# Patient Record
Sex: FEMALE | Race: WHITE | NOT HISPANIC OR LATINO | Employment: FULL TIME | URBAN - METROPOLITAN AREA
[De-identification: names, ages, dates, MRNs, and addresses within clinical notes are randomized per-mention and may not be internally consistent; named-entity substitution may affect disease eponyms.]

---

## 2022-02-10 ENCOUNTER — EVALUATION (OUTPATIENT)
Dept: PHYSICAL THERAPY | Facility: MEDICAL CENTER | Age: 52
End: 2022-02-10
Payer: COMMERCIAL

## 2022-02-10 DIAGNOSIS — Z47.89 ORTHOPEDIC AFTERCARE: Primary | ICD-10-CM

## 2022-02-10 DIAGNOSIS — M25.571 RIGHT ANKLE PAIN, UNSPECIFIED CHRONICITY: ICD-10-CM

## 2022-02-10 PROCEDURE — 97161 PT EVAL LOW COMPLEX 20 MIN: CPT | Performed by: PHYSICAL THERAPIST

## 2022-02-10 NOTE — PROGRESS NOTES
PT Evaluation     Today's date: 2/10/2022  Patient name: Tim Yi  : 1970  MRN: [id number]  Referring provider: Gerald Fulton MD  Dx:   Encounter Diagnosis     ICD-10-CM    1  Orthopedic aftercare  Z47 89    2  Right ankle pain, unspecified chronicity  M25 571                   Assessment  Assessment details: Tim Yi is a pleasant 46 y o  female who presents with R ankle pain 7 weeks s/p R subtalar debridement and peroneus brevis/longus tendon repairs (DOS: 21)  Patient is currently WBAT wearing a lace-up brace and sneaker  Incision on R lateral ankle visualized and is healing well with 2 very mild scabs present on the most inferior aspect of incision  No excessive erythema, no drainage, no warmth surrounding incision  Patient presented with mild abrasion/erythema and very mild bruising present on the dorsum of her R foot  Erythema subsided after bracing was removed for the physical exam  Patient was educated in proper tightness when donning lace-up brace and educated to avoid wearing multiple layers of socks when wearing brace to prevent compression of R foot  Will continue to monitor and adjust brace fit as necessary  No further referral is necessary at this time based upon examination results  The primary movement problem is R ankle hypomobility as expected 7 weeks s/p R subtalar debridement and peroneus brevis/longus tendon repairs, which limits her ability to perform sit to stand transfers  In addition, R ankle edema further limits her ability to ascend and descend stairs to prior capacity  Patient also presents with R ankle weakness compared to the contralateral side, which prevents her from ambulating longer distances  Patient would benefit from skilled PT services to address the listed impairments to facilitate a return to PLOF   Thank you for the referral     Impairments: abnormal coordination, abnormal gait, abnormal muscle firing, abnormal muscle tone, abnormal or restricted ROM, abnormal movement, activity intolerance, impaired balance, impaired physical strength, lacks appropriate home exercise program and pain with function  Functional limitations: sit to stand transfers, ascending/descending stairs, walking, biking  Symptom irritability: lowBarriers to therapy: none  Understanding of Dx/Px/POC: good   Prognosis: good  Prognosis details: Positive prognostic indicators include positive attitude toward recovery  Negative prognostic indicators include chronicity of R ankle pain prior to surgery  Goals  Patient will be independent with home exercise program    Patient will demonstrate decreased swelling in R ankle to improve R ankle AROM  Patient will increase R ankle AROM to be nearly comparable to the contralateral side in all planes to improve quality of gait mechanics  Patient will increase R ankle strength to at least 4+/5 to be able to ambulate longer distances  Patient will be able to perform sit to stand transfers without pain  Patient will be able to ambulate 30 min with modifications as necessary  Patient will be able to ride her bike  Patient will be able to manage symptoms independently  Plan  Plan details: Prognosis is above given PT services 2x/week tapering to 1x/week over the next 8 weeks and given HEP adherence    Patient would benefit from: skilled physical therapy  Referral necessary: No  Planned modality interventions: cryotherapy and thermotherapy: hydrocollator packs  Planned therapy interventions: activity modification, balance, balance/weight bearing training, body mechanics training, compression, flexibility, functional ROM exercises, gait training, graded activity, graded exercise, home exercise program, joint mobilization, manual therapy, massage, Seals taping, motor coordination training, neuromuscular re-education, patient education, strengthening, stretching, therapeutic activities and therapeutic exercise  Frequency: 2x week  Duration in weeks: 8  Treatment plan discussed with: patient        Subjective Evaluation    History of Present Illness  Date of surgery: 2021  Mechanism of injury: surgery  Mechanism of injury: This is a 45 yo female presenting with R ankle pain 7 weeks s/p R subtalar debridement with repair of peroneus brevis and longus tendon (DOS: 21)  She reports that she has a history of R ankle pain after rolling her ankle a few times over the years when playing softball  She reports that she overturned her ankle when walking 2 years ago, and she developed a lot of ankle pain that limited her ability to walk  She received a CT and an MRI at this time that showed arthritis and bone spurs  She decided to proceed with a subtalar debridement on 21 and also had her peroneus brevis and longus tendons repaired  She reports that she was NWB in a CAM boot for 2 weeks after the surgery  After 2 weeks, she was WBAT in her CAM boot and used crutches for 1-2 weeks  She had her last physician follow-up on 22, and she has now weaned out of her CAM boot and into a lace-up brace with her sneaker  She reports that her pain is very minimal, but she has some mild pain in the front of her ankle when standing to get up after sitting for a long period of time  She also reports stiffness in the back of her ankle  Her biggest goals are to return to walking and biking            Not a recurrent problem   Quality of life: good    Pain  Current pain ratin  At best pain ratin  At worst pain ratin  Location: front of R ankle  Quality: tight (tightness in the back of R ankle)  Relieving factors: ice  Aggravating factors: stair climbing, standing and walking  Progression: improved    Social Support    Employment status: working (works in administration)    Diagnostic Tests  MRI studies: abnormal (prior to surgery per patient)  CT scan: abnormal (prior to surgery per patient)  Treatments  Previous treatment: injection treatment and immobilization  Patient Goals  Patient goals for therapy: decreased pain, increased motion, increased strength, independence with ADLs/IADLs and return to sport/leisure activities  Patient goal: to be able to walk, to be able to ascend/descend stairs, to be able to bike        Objective     Observations     Right Ankle/Foot   Positive for abrasion (dorsum of R foot), edema (mild- R lateral ankle) and incision       Additional Observation Details  Incision on R lateral ankle visualized and is well-healed with 2 very mild scabs noted on most inferior aspect of the incision; no excessive erythema, no drainage, no warmth    Mild erythema/very mild bruising noted on dorsum of R foot (most likely due to compression from bracing being too tight)    Active Range of Motion   Left Ankle/Foot   Dorsiflexion (ke): 0 degrees   Plantar flexion: 45 degrees   Inversion: 40 degrees   Eversion: 10 degrees     Right Ankle/Foot   Dorsiflexion (ke): 0 degrees   Plantar flexion: 40 degrees   Inversion: 4 degrees   Eversion: 2 degrees     Passive Range of Motion   Left Ankle/Foot    Dorsiflexion (ke): 5 degrees   Plantar flexion: 45 degrees   Inversion: 40 degrees   Eversion: 15 degrees     Right Ankle/Foot    Dorsiflexion (ke): 0 degrees   Plantar flexion: 45 degrees   Inversion: 8 degrees   Eversion: 5 degrees     Strength/Myotome Testing     Left Ankle/Foot   Dorsiflexion: 5  Plantar flexion: 5  Inversion: 5  Eversion: 5    Additional Strength Details  R ankle strength testing deferred due to post-op period    Tests     Additional Tests Details  R ankle special testing deferred due to post-op period    Ambulation   Weight-Bearing Status   Weight-Bearing Status (Right): weight-bearing as tolerated    wearing lace-up brace     Observational Gait   Gait: antalgic              Precautions: s/p R subtalar joint debridement + peroneus longus/brevis tendon repairs (DOS: 12/23/21)- WBAT in ankle brace      Manuals 2/10 R ankle PROM (NO INV/EV)                                                    Neuro Re-Ed             Romberg             Tandem             SLS                                                                 Ther Ex             Rec bike NV            Ankle DF/PF AROM 5"x30 HEP            Strap gastroc stretch NV            Seated heel slides NV            Seated towel intrinsic scoops NV            BAPS NV DF/PF                                      Ther Activity                                       Gait Training                                       Modalities             CP to R ankle PRN

## 2022-02-15 ENCOUNTER — OFFICE VISIT (OUTPATIENT)
Dept: PHYSICAL THERAPY | Facility: MEDICAL CENTER | Age: 52
End: 2022-02-15
Payer: COMMERCIAL

## 2022-02-15 DIAGNOSIS — M25.571 RIGHT ANKLE PAIN, UNSPECIFIED CHRONICITY: Primary | ICD-10-CM

## 2022-02-15 DIAGNOSIS — Z47.89 ORTHOPEDIC AFTERCARE: ICD-10-CM

## 2022-02-15 PROCEDURE — 97110 THERAPEUTIC EXERCISES: CPT | Performed by: PHYSICAL THERAPIST

## 2022-02-15 PROCEDURE — 97140 MANUAL THERAPY 1/> REGIONS: CPT | Performed by: PHYSICAL THERAPIST

## 2022-02-15 NOTE — PROGRESS NOTES
Daily Note     Today's date: 2/15/2022  Patient name: Rodney Hicks  : 1970  MRN: [id number]  Referring provider: Ning Fisher MD  Dx:   Encounter Diagnosis     ICD-10-CM    1  Right ankle pain, unspecified chronicity  M25 571    2  Orthopedic aftercare  Z47 89                   Subjective: Patient reports that her ankle is feeling pretty good with no significant pain, but she has some stiffness at times  She reports that she has some stiffness first thing in the mornings  Objective: See treatment diary below      Assessment: Patient continues to demonstrate R ankle DF hypomobility  However, she responded well to manuals with improved R ankle DF AROM post-tx  Added gentle towel stretch and intrinsic scoops to HEP to improve ankle mobility and foot strength  Patient was also educated to increase frequency of active ankle DF/PF AROM during HEP to further improve ROM  All exercises performed in a pain-free range  Patient would benefit from continued PT to address impairments to maximize function  Plan: Continue per plan of care        Precautions: s/p R subtalar joint debridement + peroneus longus/brevis tendon repairs (DOS: 21)- WBAT in ankle brace      Manuals 2/10 2/15           R ankle PROM (NO INV/EV)  KP                                                  Neuro Re-Ed             Romberg  NV           Tandem  NV           SLS                                                                 Ther Ex             Rec bike NV 5 min           Ankle DF/PF AROM 5"x30 HEP 5"x30 tband NV          Strap gastroc stretch NV 30"x4           Seated heel slides NV 5"x10           Seated towel intrinsic scoops NV 5"x20           BAPS NV DF/PF 3"x20 ea DF/PF L2           Weight shifts  20 ea                        Ther Activity                                       Gait Training                                       Modalities             CP to R ankle PRN

## 2022-02-17 ENCOUNTER — OFFICE VISIT (OUTPATIENT)
Dept: PHYSICAL THERAPY | Facility: MEDICAL CENTER | Age: 52
End: 2022-02-17
Payer: COMMERCIAL

## 2022-02-17 DIAGNOSIS — M25.571 RIGHT ANKLE PAIN, UNSPECIFIED CHRONICITY: Primary | ICD-10-CM

## 2022-02-17 DIAGNOSIS — Z47.89 ORTHOPEDIC AFTERCARE: ICD-10-CM

## 2022-02-17 PROCEDURE — 97110 THERAPEUTIC EXERCISES: CPT | Performed by: PHYSICAL THERAPIST

## 2022-02-17 PROCEDURE — 97140 MANUAL THERAPY 1/> REGIONS: CPT | Performed by: PHYSICAL THERAPIST

## 2022-02-17 NOTE — PROGRESS NOTES
Daily Note     Today's date: 2022  Patient name: Michele Peres  : 1970  MRN: [id number]  Referring provider: Pooja Swanson MD  Dx:   Encounter Diagnosis     ICD-10-CM    1  Right ankle pain, unspecified chronicity  M25 571    2  Orthopedic aftercare  Z47 89                   Subjective: Patient reports that her ankle is feeling much better, and it is a lot less tight when walking  Objective: See treatment diary below      Assessment: Patient continues to demonstrate good progress with improving R ankle DF/PF AROM  Positive response to manuals with improved R ankle DF PROM post-tx  Continued to hold on INV/EV PROM to prevent excessive stress on healing peroneal tendons  Patient was given tband for DF/PF AROM for HEP performance, which demonstrates good progress toward goals  Initiated balance training today while wearing lace-up brace with cueing provided to prevent cervical FLX  All exercises performed in a pain-free range  Patient would benefit from continued PT to improve ankle mobility and strength to return to active lifestyle  Plan: Continue per plan of care        Precautions: s/p R subtalar joint debridement + peroneus longus/brevis tendon repairs (DOS: 21)- WBAT in ankle brace      Manuals 2/10 2/15 2/17          R ankle PROM (NO INV/EV)  KP KP                                                 Neuro Re-Ed             Romberg  NV           Tandem  NV 3x ea to fatigue b/l          SLS                                                                 Ther Ex             Rec bike NV 5 min 5 min          Ankle DF/PF AROM 5"x30 HEP 5"x30 3"x20 YTB          Strap gastroc stretch NV 30"x4 30"x4          Seated heel slides NV 5"x10 10"x10          Seated towel intrinsic scoops NV 5"x20 5"x20          BAPS NV DF/PF 3"x20 ea DF/PF L2 3"x30 ea DF/PF L2          Weight shifts  20 ea 20 ea                       Ther Activity                                       Gait Training Modalities             CP to R ankle PRN

## 2022-02-21 ENCOUNTER — OFFICE VISIT (OUTPATIENT)
Dept: PHYSICAL THERAPY | Facility: MEDICAL CENTER | Age: 52
End: 2022-02-21
Payer: COMMERCIAL

## 2022-02-21 DIAGNOSIS — Z47.89 ORTHOPEDIC AFTERCARE: ICD-10-CM

## 2022-02-21 DIAGNOSIS — M25.571 RIGHT ANKLE PAIN, UNSPECIFIED CHRONICITY: Primary | ICD-10-CM

## 2022-02-21 PROCEDURE — 97110 THERAPEUTIC EXERCISES: CPT | Performed by: PHYSICAL THERAPIST

## 2022-02-21 PROCEDURE — 97140 MANUAL THERAPY 1/> REGIONS: CPT | Performed by: PHYSICAL THERAPIST

## 2022-02-21 NOTE — PROGRESS NOTES
Daily Note     Today's date: 2022  Patient name: Deborah Purdy  : 1970  MRN: [id number]  Referring provider: Bela Crawford MD  Dx:   Encounter Diagnosis     ICD-10-CM    1  Right ankle pain, unspecified chronicity  M25 571    2  Orthopedic aftercare  Z47 89                   Subjective: Patient reports that her ankle is continuing to feel better overall  She has some stiffness in the mornings and some discomfort on the inside of her ankle at times but is pleased with her overall progress  Objective: See treatment diary below      Assessment: Patient continues to demonstrate good progress with improving R ankle DF/PF AROM and PROM  Initiated gentle INV/EV PROM and AROM within a pain-free range  Cueing provided to prevent compensation from knee during INV/EV AROM  Patient was given tband of increased resistance for DF/PF for HEP performance, which demonstrates good progress toward goals  Significant improvement in control noted during tandem stance today  Patient reported alleviation of stiffness when ambulating at the end of session  Patient would benefit from continued PT to improve ankle mobility and stability to return to active lifestyle  Plan: Continue per plan of care        Precautions: s/p R subtalar joint debridement + peroneus longus/brevis tendon repairs (DOS: 21)- WBAT in ankle brace      Manuals 2/10 2/15 2/17 2/21         R ankle PROM   KP DF/PF KP DF/PF KP 4-way                                                Neuro Re-Ed             Romberg  NV           Tandem  NV 3x ea to fatigue b/l 5x ea to fatigue b/l         SLS                                                                 Ther Ex             Rec bike NV 5 min 5 min 5 min         Ankle DF/PF AROM 5"x30 HEP 5"x30 3"x20 YTB 3"x20 RTB         Ankle INV/EV AROM    3"x10 ea no band         Strap gastroc stretch NV 30"x4 30"x4 30"x4         Seated heel slides NV 5"x10 10"x10 10"x10         Seated towel intrinsic scoops NV 5"x20 5"x20 5"x30         BAPS NV DF/PF 3"x20 ea DF/PF L2 3"x30 ea DF/PF L2 3"x30 ea DF/PF L3         Wall gastroc stretch    20"x3         Weight shifts  20 ea 20 ea                       Ther Activity                                       Gait Training                                       Modalities             CP to R ankle PRN

## 2022-02-24 ENCOUNTER — OFFICE VISIT (OUTPATIENT)
Dept: PHYSICAL THERAPY | Facility: MEDICAL CENTER | Age: 52
End: 2022-02-24
Payer: COMMERCIAL

## 2022-02-24 DIAGNOSIS — M25.571 RIGHT ANKLE PAIN, UNSPECIFIED CHRONICITY: Primary | ICD-10-CM

## 2022-02-24 DIAGNOSIS — Z47.89 ORTHOPEDIC AFTERCARE: ICD-10-CM

## 2022-02-24 PROCEDURE — 97110 THERAPEUTIC EXERCISES: CPT | Performed by: PHYSICAL THERAPIST

## 2022-02-24 PROCEDURE — 97140 MANUAL THERAPY 1/> REGIONS: CPT | Performed by: PHYSICAL THERAPIST

## 2022-02-24 NOTE — PROGRESS NOTES
Daily Note     Today's date: 2022  Patient name: Tim Yi  : 1970  MRN: [id number]  Referring provider: Gerald Fulton MD  Dx:   Encounter Diagnosis     ICD-10-CM    1  Right ankle pain, unspecified chronicity  M25 571    2  Orthopedic aftercare  Z47 89                   Subjective: Patient reports that her ankle is feeling okay with no major changes since last session  She still has some discomfort on the inside of her ankle at times  Objective: See treatment diary below      Assessment: Patient continues to demonstrate R ankle INV/EV hypomobility  However, subtalar AROM has improved since last session, and she is consistently demonstrating improved R ankle DF AROM  Able to progress resistance for resisted ankle DF/PF, which further demonstrates good progress toward goals  Added seated heel raises to improve CKC strength within a pain-free range  Patient also demonstrated improved control during tandem stance today  No pain reported during or after session  Patient would benefit from continued PT to progress strengthening as able to return to PLOF  Plan: Continue per plan of care        Precautions: s/p R subtalar joint debridement + peroneus longus/brevis tendon repairs (DOS: 21)- WBAT in ankle brace      Manuals 2/10 2/15 2/17 2/21 2/24        R ankle PROM   KP DF/PF KP DF/PF KP 4-way KP 4-way                                               Neuro Re-Ed             Romberg  NV           Tandem  NV 3x ea to fatigue b/l 5x ea to fatigue b/l 5x ea to fatigue b/l        SLS                                                                 Ther Ex             Rec bike NV 5 min 5 min 5 min 5 min        Ankle DF/PF AROM 5"x30 HEP 5"x30 3"x20 YTB 3"x20 RTB 3"x20 GTB        Ankle INV/EV AROM    3"x10 ea no band 3"x20 ea no band        Strap gastroc stretch NV 30"x4 30"x4 30"x4 30"x4        Seated heel slides NV 5"x10 10"x10 10"x10 10"x10        Seated heel raises     2x10        Seated towel intrinsic scoops NV 5"x20 5"x20 5"x30 5"x30        BAPS NV DF/PF 3"x20 ea DF/PF L2 3"x30 ea DF/PF L2 3"x30 ea DF/PF L3 3"x30 ea DF/PF L3        Wall gastroc stretch    20"x3 30"x4        Weight shifts  20 ea 20 ea                       Ther Activity                                       Gait Training                                       Modalities             CP to R ankle PRN

## 2022-02-28 ENCOUNTER — OFFICE VISIT (OUTPATIENT)
Dept: PHYSICAL THERAPY | Facility: MEDICAL CENTER | Age: 52
End: 2022-02-28
Payer: COMMERCIAL

## 2022-02-28 DIAGNOSIS — M25.571 RIGHT ANKLE PAIN, UNSPECIFIED CHRONICITY: Primary | ICD-10-CM

## 2022-02-28 DIAGNOSIS — Z47.89 ORTHOPEDIC AFTERCARE: ICD-10-CM

## 2022-02-28 PROCEDURE — 97110 THERAPEUTIC EXERCISES: CPT | Performed by: PHYSICAL THERAPIST

## 2022-02-28 PROCEDURE — 97140 MANUAL THERAPY 1/> REGIONS: CPT | Performed by: PHYSICAL THERAPIST

## 2022-02-28 NOTE — PROGRESS NOTES
Daily Note     Today's date: 2022  Patient name: Hemalatha Tracy  : 1970  MRN: [id number]  Referring provider: Yelitza Cobb MD  Dx:   Encounter Diagnosis     ICD-10-CM    1  Right ankle pain, unspecified chronicity  M25 571    2  Orthopedic aftercare  Z47 89                   Subjective: Patient reports that she walked approx  1 mile at a festival over the weekend and had some soreness yesterday and this morning  However, she reports that the stretches helped reduce the soreness  Objective: See treatment diary below      Assessment: Patient continues to demonstrate steady progress with improving R ankle DF AROM and PROM  She presents with INV/EV hypomobility  However, she demonstrated a notable improvement in INV/EV AROM today compared to last session  Held CKC strengthening progressions due to baseline soreness since last visit  However, she was able to progress tandem stance to be performed on airex pad today, which demonstrates an improvement in neuromotor control  All exercises performed in a pain-free range  Patient would benefit from continued PT to improve ankle ROM and strength to return to full participation in active lifestyle to prior capacity  Plan: Continue per plan of care        Precautions: s/p R subtalar joint debridement + peroneus longus/brevis tendon repairs (DOS: 21)- WBAT in ankle brace      Manuals 2/10 2/15 2/17 2/21 2/24 2/28       R ankle PROM   KP DF/PF KP DF/PF KP 4-way KP 4-way KP 4-way                                              Neuro Re-Ed             Romberg  NV           Tandem  NV 3x ea to fatigue b/l 5x ea to fatigue b/l 5x ea to fatigue b/l 5x ea to fatigue b/l airex       SLS                                                                 Ther Ex             Rec bike NV 5 min 5 min 5 min 5 min 5 min       Ankle DF/PF AROM 5"x30 HEP 5"x30 3"x20 YTB 3"x20 RTB 3"x20 GTB 3"x30 GTB       Ankle INV/EV AROM    3"x10 ea no band 3"x20 ea no band 5"x30 ea no band       Strap gastroc stretch NV 30"x4 30"x4 30"x4 30"x4 30"x4       Seated heel slides NV 5"x10 10"x10 10"x10 10"x10 10"x10       Seated heel raises     2x10 2x10       Seated towel intrinsic scoops NV 5"x20 5"x20 5"x30 5"x30 5"x30       BAPS NV DF/PF 3"x20 ea DF/PF L2 3"x30 ea DF/PF L2 3"x30 ea DF/PF L3 3"x30 ea DF/PF L3 5"x30 ea DF/PF L3       Wall gastroc stretch    20"x3 30"x4 30"x4       Weight shifts  20 ea 20 ea                       Ther Activity                                       Gait Training                                       Modalities             CP to R ankle PRN

## 2022-03-03 ENCOUNTER — OFFICE VISIT (OUTPATIENT)
Dept: PHYSICAL THERAPY | Facility: MEDICAL CENTER | Age: 52
End: 2022-03-03
Payer: COMMERCIAL

## 2022-03-03 DIAGNOSIS — M25.571 RIGHT ANKLE PAIN, UNSPECIFIED CHRONICITY: Primary | ICD-10-CM

## 2022-03-03 DIAGNOSIS — Z47.89 ORTHOPEDIC AFTERCARE: ICD-10-CM

## 2022-03-03 PROCEDURE — 97110 THERAPEUTIC EXERCISES: CPT | Performed by: PHYSICAL THERAPIST

## 2022-03-03 PROCEDURE — 97140 MANUAL THERAPY 1/> REGIONS: CPT | Performed by: PHYSICAL THERAPIST

## 2022-03-03 PROCEDURE — 97112 NEUROMUSCULAR REEDUCATION: CPT | Performed by: PHYSICAL THERAPIST

## 2022-03-03 NOTE — PROGRESS NOTES
Daily Note     Today's date: 3/3/2022  Patient name: Ben Null  : 1970  MRN: [id number]  Referring provider: Fernando Jurado MD  Dx:   Encounter Diagnosis     ICD-10-CM    1  Right ankle pain, unspecified chronicity  M25 571    2  Orthopedic aftercare  Z47 89                   Subjective: Patient reports that her ankle has some stiffness at times, but her soreness has resolved  She reports that it is getting better overall  Objective: See treatment diary below      Assessment: Patient is demonstrating steady progress with improving R ankle DF/PF AROM and PROM  She continues to present with INV/EV hypomobility but is demonstrating good progress with restoring R subtalar AROM each session  Able to progress reps for seated heel raises, which demonstrates good progress toward goals  Added INV/EV BAPS board to improve subtalar mobility/stability with cueing provided to prevent compensation from knee  Significant improvement noted during tandem stance today compared to last session  Patient was challenged with addition of SLS but was able to complete intervention without pain  All exercises performed in a pain-free range  Patient would benefit from continued PT to further improve ankle ROM and strength to return to PLOF  Plan: Continue per plan of care        Precautions: s/p R subtalar joint debridement + peroneus longus/brevis tendon repairs (DOS: 21)- WBAT in ankle brace      Manuals 2/10 2/15 2/17 2/21 2/24 2/28 3/3      R ankle PROM   KP DF/PF KP DF/PF KP 4-way KP 4-way KP 4-way KP 4-way                                             Neuro Re-Ed             Romberg  NV           Tandem  NV 3x ea to fatigue b/l 5x ea to fatigue b/l 5x ea to fatigue b/l 5x ea to fatigue b/l airex 5x ea to fatigue b/l airex      SLS       5x to fatigue no airex                                                          Ther Ex             Rec bike NV 5 min 5 min 5 min 5 min 5 min 5 min      Ankle DF/PF AROM 5"x30 HEP 5"x30 3"x20 YTB 3"x20 RTB 3"x20 GTB 3"x30 GTB 3"x30 GTB      Ankle INV/EV AROM    3"x10 ea no band 3"x20 ea no band 5"x30 ea no band 5"x30 ea no band      Strap gastroc stretch NV 30"x4 30"x4 30"x4 30"x4 30"x4 30"x4      Seated heel slides NV 5"x10 10"x10 10"x10 10"x10 10"x10 10"x10      Seated heel raises     2x10 2x10 3x10      Seated towel intrinsic scoops NV 5"x20 5"x20 5"x30 5"x30 5"x30 5"x30      BAPS NV DF/PF 3"x20 ea DF/PF L2 3"x30 ea DF/PF L2 3"x30 ea DF/PF L3 3"x30 ea DF/PF L3 5"x30 ea DF/PF L3 5"x30 ea DF/PF L3, 3"x20 ea INV/EV L1      Wall gastroc stretch    20"x3 30"x4 30"x4 30"x4      Weight shifts  20 ea 20 ea                       Ther Activity                                       Gait Training                                       Modalities             CP to R ankle PRN

## 2022-03-07 ENCOUNTER — OFFICE VISIT (OUTPATIENT)
Dept: PHYSICAL THERAPY | Facility: MEDICAL CENTER | Age: 52
End: 2022-03-07
Payer: COMMERCIAL

## 2022-03-07 DIAGNOSIS — Z47.89 ORTHOPEDIC AFTERCARE: ICD-10-CM

## 2022-03-07 DIAGNOSIS — M25.571 RIGHT ANKLE PAIN, UNSPECIFIED CHRONICITY: Primary | ICD-10-CM

## 2022-03-07 PROCEDURE — 97110 THERAPEUTIC EXERCISES: CPT | Performed by: PHYSICAL THERAPIST

## 2022-03-07 PROCEDURE — 97112 NEUROMUSCULAR REEDUCATION: CPT | Performed by: PHYSICAL THERAPIST

## 2022-03-07 PROCEDURE — 97140 MANUAL THERAPY 1/> REGIONS: CPT | Performed by: PHYSICAL THERAPIST

## 2022-03-07 NOTE — PROGRESS NOTES
Daily Note     Today's date: 3/7/2022  Patient name: Carmelita Gr  : 1970  MRN: [id number]  Referring provider: Carola Werner MD  Dx:   Encounter Diagnosis     ICD-10-CM    1  Right ankle pain, unspecified chronicity  M25 571    2  Orthopedic aftercare  Z47 [id number]                   Subjective: Patient reports that she has some discomfort in the front of her ankle in the middle of the night, but it seems to get better with movement and exercises  She is pleased with her overall progress  Objective: See treatment diary below      Assessment: Patient continues to demonstrate good progress with improving R ankle AROM and PROM in all planes  She demonstrates the most notable restrictions in INV/EV AROM and PROM, but this continues to improve each session  Patient was given tband of increased resistance for DF/PF for HEP performance, which demonstrates good progress toward goals  Initiated gentle CKC strengthening with addition of standing heel/toe raises  Cueing provided during toe raises to prevent compensation from trunk  Patient demonstrated fatigue after SLS on airex pad but was able to complete intervention without LOB  Patient also reported alleviation of tightness after reps of wall soleus stretch  Patient would benefit from continued PT      Plan: Continue per plan of care        Precautions: s/p R subtalar joint debridement + peroneus longus/brevis tendon repairs (DOS: 21)- WBAT in ankle brace      Manuals 2/10 2/15 2/17 2/21 2/24 2/28 3/3 3     R ankle PROM   KP DF/PF KP DF/PF KP 4-way KP 4-way KP 4-way KP 4-way KP 4-way                                            Neuro Re-Ed             Romberg  NV           Tandem  NV 3x ea to fatigue b/l 5x ea to fatigue b/l 5x ea to fatigue b/l 5x ea to fatigue b/l airex 5x ea to fatigue b/l airex 5x ea to fatigue b/l airex     SLS       5x to fatigue no airex 5x to fatigue airex, finger touch Ther Ex             Rec bike NV 5 min 5 min 5 min 5 min 5 min 5 min 5 min     Ankle DF/PF AROM 5"x30 HEP 5"x30 3"x20 YTB 3"x20 RTB 3"x20 GTB 3"x30 GTB 3"x30 GTB 5"x30 blue     Ankle INV/EV AROM    3"x10 ea no band 3"x20 ea no band 5"x30 ea no band 5"x30 ea no band 5"x30 ea no band     Strap gastroc stretch NV 30"x4 30"x4 30"x4 30"x4 30"x4 30"x4 30"x4     Seated heel slides NV 5"x10 10"x10 10"x10 10"x10 10"x10 10"x10 10"x10     Seated heel raises     2x10 2x10 3x10 3"x20 stand     Seated towel intrinsic scoops NV 5"x20 5"x20 5"x30 5"x30 5"x30 5"x30 HEP     Standing toe raises        3"x20     BAPS NV DF/PF 3"x20 ea DF/PF L2 3"x30 ea DF/PF L2 3"x30 ea DF/PF L3 3"x30 ea DF/PF L3 5"x30 ea DF/PF L3 5"x30 ea DF/PF L3, 3"x20 ea INV/EV L1 5"x30 ea DF/PF L3, 3"x20 ea INV/EV L3     Wall gastroc stretch    20"x3 30"x4 30"x4 30"x4 30"x4     Wall soleus stretch        20"x3     Weight shifts  20 ea 20 ea                       Ther Activity                                       Gait Training                                       Modalities             CP to R ankle PRN

## 2022-03-10 ENCOUNTER — OFFICE VISIT (OUTPATIENT)
Dept: PHYSICAL THERAPY | Facility: MEDICAL CENTER | Age: 52
End: 2022-03-10
Payer: COMMERCIAL

## 2022-03-10 DIAGNOSIS — Z47.89 ORTHOPEDIC AFTERCARE: ICD-10-CM

## 2022-03-10 DIAGNOSIS — M25.571 RIGHT ANKLE PAIN, UNSPECIFIED CHRONICITY: Primary | ICD-10-CM

## 2022-03-10 PROCEDURE — 97110 THERAPEUTIC EXERCISES: CPT | Performed by: PHYSICAL THERAPIST

## 2022-03-10 PROCEDURE — 97112 NEUROMUSCULAR REEDUCATION: CPT | Performed by: PHYSICAL THERAPIST

## 2022-03-10 PROCEDURE — 97140 MANUAL THERAPY 1/> REGIONS: CPT | Performed by: PHYSICAL THERAPIST

## 2022-03-10 NOTE — PROGRESS NOTES
Daily Note     Today's date: 3/10/2022  Patient name: Juan Parisi  : 1970  MRN: [id number]  Referring provider: Geri Moscoso MD  Dx:   Encounter Diagnosis     ICD-10-CM    1  Right ankle pain, unspecified chronicity  M25 571    2  Orthopedic aftercare  Z47 [id number]                   Subjective: Patient reports that her ankle is feeling better with less stiffness and pain at night  Objective: See treatment diary below      Assessment: Patient continues to demonstrate good progress with improving R ankle AROM and PROM in all planes each session  INV/EV hypomobility remains but has improved today compared to last session  Added tband to resisted INV/EV, which demonstrates good progress toward goals  Patient reported mild discomfort in R lateral ankle just inferior to lateral malleolus after INV/EV interventions, but she was able to complete exercise without pain  No tenderness to palpation of peroneal tendons  SLS stability notably improved today  Patient was given tband of increased resistance for DF/PF for HEP performance  Patient would benefit from continued PT      Plan: Continue per plan of care        Precautions: s/p R subtalar joint debridement + peroneus longus/brevis tendon repairs (DOS: 21)- WBAT in ankle brace      Manuals 2/10 2/15 2/17 2/21 2/24 2/28 3/3 3/7 3/10    R ankle PROM   KP DF/PF KP DF/PF KP 4-way KP 4-way KP 4-way KP 4-way KP 4-way KP 4-way                                           Neuro Re-Ed             Romberg  NV           Tandem  NV 3x ea to fatigue b/l 5x ea to fatigue b/l 5x ea to fatigue b/l 5x ea to fatigue b/l airex 5x ea to fatigue b/l airex 5x ea to fatigue b/l airex 5x ea to fatgiue b/l airex    SLS       5x to fatigue no airex 5x to fatigue airex, finger touch 5x to fatigue airex, finger touch                                                        Ther Ex             Rec bike NV 5 min 5 min 5 min 5 min 5 min 5 min 5 min 5 min    Ankle DF/PF AROM 5"x30 HEP 5"x30 3"x20 YTB 3"x20 RTB 3"x20 GTB 3"x30 GTB 3"x30 GTB 5"x30 blue 5"x30 black    Ankle INV/EV AROM    3"x10 ea no band 3"x20 ea no band 5"x30 ea no band 5"x30 ea no band 5"x30 ea no band 3"x10 ea YTB    Strap gastroc stretch NV 30"x4 30"x4 30"x4 30"x4 30"x4 30"x4 30"x4 30"x4    Seated heel slides NV 5"x10 10"x10 10"x10 10"x10 10"x10 10"x10 10"x10 NV    Seated heel raises     2x10 2x10 3x10 3"x20 stand 3"x30 stand    Seated towel intrinsic scoops NV 5"x20 5"x20 5"x30 5"x30 5"x30 5"x30 HEP     Standing toe raises        3"x20 3"x20    BAPS NV DF/PF 3"x20 ea DF/PF L2 3"x30 ea DF/PF L2 3"x30 ea DF/PF L3 3"x30 ea DF/PF L3 5"x30 ea DF/PF L3 5"x30 ea DF/PF L3, 3"x20 ea INV/EV L1 5"x30 ea DF/PF L3, 3"x20 ea INV/EV L3 5"x30 ea 4-way L3    Wall gastroc stretch    20"x3 30"x4 30"x4 30"x4 30"x4 30"x4    Wall soleus stretch        20"x3 30"x4    Weight shifts  20 ea 20 ea                       Ther Activity                                       Gait Training                                       Modalities             CP to R ankle PRN

## 2022-03-14 ENCOUNTER — OFFICE VISIT (OUTPATIENT)
Dept: PHYSICAL THERAPY | Facility: MEDICAL CENTER | Age: 52
End: 2022-03-14
Payer: COMMERCIAL

## 2022-03-14 DIAGNOSIS — Z47.89 ORTHOPEDIC AFTERCARE: ICD-10-CM

## 2022-03-14 DIAGNOSIS — M25.571 RIGHT ANKLE PAIN, UNSPECIFIED CHRONICITY: Primary | ICD-10-CM

## 2022-03-14 PROCEDURE — 97140 MANUAL THERAPY 1/> REGIONS: CPT | Performed by: PHYSICAL THERAPIST

## 2022-03-14 PROCEDURE — 97112 NEUROMUSCULAR REEDUCATION: CPT | Performed by: PHYSICAL THERAPIST

## 2022-03-14 PROCEDURE — 97110 THERAPEUTIC EXERCISES: CPT | Performed by: PHYSICAL THERAPIST

## 2022-03-14 NOTE — PROGRESS NOTES
Daily Note     Today's date: 3/14/2022  Patient name: Christie Loya  : 1970  MRN: [id number]  Referring provider: Jailene Trejo MD  Dx:   Encounter Diagnosis     ICD-10-CM    1  Right ankle pain, unspecified chronicity  M25 571    2  Orthopedic aftercare  Z47 89                   Subjective: Patient reports that her ankle is feeling better when walking, and she has less stiffness at night  Objective: See treatment diary below      Assessment: Patient continues to demonstrate R ankle hypomobility in all planes but responds well to manuals with improvement in R ankle AROM in all planes post-tx  Patient also demonstrated improve INV/EV AROM with resistance today compared to last session  Patient was educated to add tband INV/EV to HEP within a pain-free range  Able to perform balance interventions without visual input and improved stability, which further demonstrates good progress toward goals  Patient would benefit from continued PT to progress CKC strengthening as appropriate to return to longer distance community ambulation  Plan: Continue per plan of care        Precautions: s/p R subtalar joint debridement + peroneus longus/brevis tendon repairs (DOS: 21)- WBAT in ankle brace      Manuals 3/14 2/10 2/15 2/17 2/21 2/24 2/28 3/3 3/7 3/10    R ankle PROM  KP 4-way  KP DF/PF KP DF/PF KP 4-way KP 4-way KP 4-way KP 4-way KP 4-way KP 4-way                                              Neuro Re-Ed              Romberg   NV           Tandem 5x ea to fatigue b/l airex EO/EC  NV 3x ea to fatigue b/l 5x ea to fatigue b/l 5x ea to fatigue b/l 5x ea to fatigue b/l airex 5x ea to fatigue b/l airex 5x ea to fatigue b/l airex 5x ea to fatgiue b/l airex    SLS 5x to fatigue airex EO/EC       5x to fatigue no airex 5x to fatigue airex, finger touch 5x to fatigue airex, finger touch                                                            Ther Ex              Rec bike 5 min NV 5 min 5 min 5 min 5 min 5 min 5 min 5 min 5 min    Ankle DF/PF AROM HEP black 5"x30 HEP 5"x30 3"x20 YTB 3"x20 RTB 3"x20 GTB 3"x30 GTB 3"x30 GTB 5"x30 blue 5"x30 black    Ankle INV/EV AROM 5"x20 ea YTB    3"x10 ea no band 3"x20 ea no band 5"x30 ea no band 5"x30 ea no band 5"x30 ea no band 3"x10 ea YTB    Strap gastroc stretch 30"x4 NV 30"x4 30"x4 30"x4 30"x4 30"x4 30"x4 30"x4 30"x4    Seated heel slides 10"x10 NV 5"x10 10"x10 10"x10 10"x10 10"x10 10"x10 10"x10 NV    Seated heel raises 5"x30 stand     2x10 2x10 3x10 3"x20 stand 3"x30 stand    Seated towel intrinsic scoops HEP NV 5"x20 5"x20 5"x30 5"x30 5"x30 5"x30 HEP     Standing toe raises 5"x20        3"x20 3"x20    BAPS 5"x30 ea 4-way L3 NV DF/PF 3"x20 ea DF/PF L2 3"x30 ea DF/PF L2 3"x30 ea DF/PF L3 3"x30 ea DF/PF L3 5"x30 ea DF/PF L3 5"x30 ea DF/PF L3, 3"x20 ea INV/EV L1 5"x30 ea DF/PF L3, 3"x20 ea INV/EV L3 5"x30 ea 4-way L3    Wall gastroc stretch 30"x4    20"x3 30"x4 30"x4 30"x4 30"x4 30"x4    Wall soleus stretch 30"x4        20"x3 30"x4    Weight shifts   20 ea 20 ea                        Ther Activity                                          Gait Training                                          Modalities              CP to R ankle PRN Mr. Valderrama is an 81 year old male with ACC/AHA Stage D ICM, HFrEF (EF 20-25%, LVEDD 6.2 cm), s/p CRT-D (9/13/19), h/o severe MR and TR, s/p MitraClip x2 (9/6/19), CAD, MI s/p mLAD stent, PAD with stents in 2005, history of DVT (on Xarelto), HTN, HLD, COPD, DENNYS on CPAP, who was directly admitted from the HF clinic for ADHF. This is his 3rd admission in the past 6 months for HF. Both prior hospitalizations he required inotropic support and was diuresed with IV diuretics. His hospitalizations have been c/b ASYA on CKD. This admission he was found to be in acute cardiogenic shock and was started on a Bumex gtt and dobutamine. He is s/p RHC and CardioMEMS implant on 10/1 which showed elevated filling pressures and low CI (RA 20, PA 52/26, PCWP 26, CI 2.13 on dobutamine at 2.5 mcg/kg/min). Dobutamine was increased to 5 mcg/kg/min.    His diuretics were held over this past weekend for a rise in his SCr which was probably in the setting of rapid diuresis and is now improving, but remains with elevated filling pressures. His weight is uptrending and his PAD on his CardioMEMS today was 26 mmHg, which is up from 23-25 mmHg yesterday. 81 year old male with ACC/AHA Stage D ICM, HFrEF (EF 20-25%, LVEDD 6.2 cm), s/p CRT-D (9/13/19), h/o severe MR and TR, s/p MitraClip x2 (9/6/19), CAD, MI s/p mLAD stent, PAD with stents in 2005, history of DVT (on Xarelto), HTN, HLD, COPD, DENNYS on CPAP, who was directly admitted from the HF clinic for ADHF. This is his 3rd admission in the past 6 months for HF. Both prior hospitalizations he required inotropic support and was diuresed with IV diuretics. His hospitalizations have been c/b ASYA on CKD. This admission he was found to be in acute cardiogenic shock and was started on a Bumex gtt and dobutamine. He is s/p RHC and CardioMEMS implant on 10/1 which showed elevated filling pressures and low CI (RA 20, PA 52/26, PCWP 26, CI 2.13 on dobutamine at 2.5 mcg/kg/min). Dobutamine was increased to 5 mcg/kg/min.    His diuretics were held over this past weekend for a rise in his SCr which was probably in the setting of rapid diuresis and is now improving, but remains with elevated filling pressures. His weight is uptrending and his PAD on his CardioMEMS today was 26 mmHg, which is up from 23-25 mmHg yesterday.

## 2022-03-17 ENCOUNTER — OFFICE VISIT (OUTPATIENT)
Dept: PHYSICAL THERAPY | Facility: MEDICAL CENTER | Age: 52
End: 2022-03-17
Payer: COMMERCIAL

## 2022-03-17 DIAGNOSIS — M25.571 RIGHT ANKLE PAIN, UNSPECIFIED CHRONICITY: Primary | ICD-10-CM

## 2022-03-17 DIAGNOSIS — Z47.89 ORTHOPEDIC AFTERCARE: ICD-10-CM

## 2022-03-17 PROCEDURE — 97112 NEUROMUSCULAR REEDUCATION: CPT | Performed by: PHYSICAL THERAPIST

## 2022-03-17 PROCEDURE — 97110 THERAPEUTIC EXERCISES: CPT | Performed by: PHYSICAL THERAPIST

## 2022-03-17 PROCEDURE — 97140 MANUAL THERAPY 1/> REGIONS: CPT | Performed by: PHYSICAL THERAPIST

## 2022-03-17 NOTE — PROGRESS NOTES
Daily Note     Today's date: 3/17/2022  Patient name: Tim Yi  : 1970  MRN: [id number]  Referring provider: Gerald Fulton MD  Dx:   Encounter Diagnosis     ICD-10-CM    1  Right ankle pain, unspecified chronicity  M25 571    2  Orthopedic aftercare  Z47 [id number]                   Subjective: Patient reports that her ankle is feeling better overall, but she still has some stiffness when going up and down the stairs  She also reports that she was able to go for 20 min walks the past few days, and she is pleased with her progress  Objective: See treatment diary below      Assessment: Patient continues to demonstrate steady progress with improving R ankle AROM in all planes  INV/EV AROM notably improved with tband resistance today  Able to progress resistance for BAPS board, which demonstrates an improvement in multiplanar ankle stability  Added step ups and wall ball squats to improve CKC DF mobility and strength  Patient demonstrated fatigue at end of session  All exercises performed in a pain-free range  Patient would benefit from continued PT to further improve R ankle CKC mobility and strength to restore full participation in active lifestyle  Plan: Continue per plan of care        Precautions: s/p R subtalar joint debridement + peroneus longus/brevis tendon repairs (DOS: 21)- WBAT in ankle brace      Manuals 3/14 3/17 2/10 2/15 2/17 2/21 2/24 2/28 3/3 3/7 3/10    R ankle PROM  KP 4-way KP 4-way  KP DF/PF KP DF/PF KP 4-way KP 4-way KP 4-way KP 4-way KP 4-way KP 4-way                                                 Neuro Re-Ed               Romberg    NV           Tandem 5x ea to fatigue b/l airex EO/EC 5x ea to fatigue b/l airex EC  NV 3x ea to fatigue b/l 5x ea to fatigue b/l 5x ea to fatigue b/l 5x ea to fatigue b/l airex 5x ea to fatigue b/l airex 5x ea to fatigue b/l airex 5x ea to fatgiue b/l airex    SLS 5x to fatigue airex EO/EC 5x to fatigue airex EC       5x to fatigue no airex 5x to fatigue airex, finger touch 5x to fatigue airex, finger touch                                                                Ther Ex               Rec bike 5 min 5 min NV 5 min 5 min 5 min 5 min 5 min 5 min 5 min 5 min    Ankle DF/PF AROM HEP black  5"x30 HEP 5"x30 3"x20 YTB 3"x20 RTB 3"x20 GTB 3"x30 GTB 3"x30 GTB 5"x30 blue 5"x30 black    Ankle INV/EV AROM 5"x20 ea YTB 5"x30 ea YTB    3"x10 ea no band 3"x20 ea no band 5"x30 ea no band 5"x30 ea no band 5"x30 ea no band 3"x10 ea YTB    Strap gastroc stretch 30"x4 HEP NV 30"x4 30"x4 30"x4 30"x4 30"x4 30"x4 30"x4 30"x4    Seated heel slides 10"x10 np NV 5"x10 10"x10 10"x10 10"x10 10"x10 10"x10 10"x10 NV    Seated heel raises 5"x30 stand 5"x30 stand     2x10 2x10 3x10 3"x20 stand 3"x30 stand    Seated towel intrinsic scoops HEP  NV 5"x20 5"x20 5"x30 5"x30 5"x30 5"x30 HEP     Standing toe raises 5"x20 5"x30        3"x20 3"x20    BAPS 5"x30 ea 4-way L3 5"x20 ea 4-way L3 5# NV DF/PF 3"x20 ea DF/PF L2 3"x30 ea DF/PF L2 3"x30 ea DF/PF L3 3"x30 ea DF/PF L3 5"x30 ea DF/PF L3 5"x30 ea DF/PF L3, 3"x20 ea INV/EV L1 5"x30 ea DF/PF L3, 3"x20 ea INV/EV L3 5"x30 ea 4-way L3    Wall balls squats  3"x20             Step ups  2x10 L3             Wall gastroc stretch 30"x4 30"x4    20"x3 30"x4 30"x4 30"x4 30"x4 30"x4    Wall soleus stretch 30"x4 30"x4        20"x3 30"x4    Weight shifts    20 ea 20 ea                         Ther Activity                                             Gait Training                                             Modalities               CP to R ankle PRN

## 2022-03-21 ENCOUNTER — EVALUATION (OUTPATIENT)
Dept: PHYSICAL THERAPY | Facility: MEDICAL CENTER | Age: 52
End: 2022-03-21
Payer: COMMERCIAL

## 2022-03-21 DIAGNOSIS — Z47.89 ORTHOPEDIC AFTERCARE: ICD-10-CM

## 2022-03-21 DIAGNOSIS — M25.571 RIGHT ANKLE PAIN, UNSPECIFIED CHRONICITY: Primary | ICD-10-CM

## 2022-03-21 PROCEDURE — 97112 NEUROMUSCULAR REEDUCATION: CPT | Performed by: PHYSICAL THERAPIST

## 2022-03-21 PROCEDURE — 97140 MANUAL THERAPY 1/> REGIONS: CPT | Performed by: PHYSICAL THERAPIST

## 2022-03-21 PROCEDURE — 97110 THERAPEUTIC EXERCISES: CPT | Performed by: PHYSICAL THERAPIST

## 2022-03-21 NOTE — PROGRESS NOTES
PT Re-Evaluation     Today's date: 3/21/2022  Patient name: Jalen Priest  : 1970  MRN: [id number]  Referring provider: Saundra Davies MD  Dx:   Encounter Diagnosis     ICD-10-CM    1  Right ankle pain, unspecified chronicity  M25 571    2  Orthopedic aftercare  Z47 89                   Subjective: Patient reports that she is pleased with her overall progress and has improved her ability to walk shorter distances and climb stairs since her initial visit  She continues to have a lot of stiffness and discomfort in the middle of the night, but she reports that the stiffness resolves when she does her exercises in the morning  She desires to continue to improve her ability to go down the stairs and walk longer distances  She also notes that she has been continuing to wear her lace-up brace at all times except when doing the exercises        Objective: See treatment diary below        Active Range of Motion   Left Ankle/Foot   Dorsiflexion (ke): 0 degrees   Plantar flexion: 45 degrees   Inversion: 40 degrees   Eversion: 10 degrees     Right Ankle/Foot   Dorsiflexion (ke): 0 degrees   Plantar flexion: 40 degrees   Inversion: 15 degrees   Eversion: 5 degrees     Passive Range of Motion   Left Ankle/Foot  Dorsiflexion (ke): 5 degrees   Plantar flexion: 45 degrees   Inversion: 40 degrees   Eversion: 15 degrees     Right Ankle/Foot  Dorsiflexion (ke): 2 degrees   Plantar flexion: 45 degrees   Inversion: 15 degrees   Eversion: 8 degrees     Strength  Left Ankle/Foot   Dorsiflexion: 5/5  Plantar flexion: 5/5  Inversion: 5/5  Eversion: 5/5    Right Ankle/Foot  Dorsiflexion (ke): 4+/5  Plantar flexion: 4+/5  Inversion: 2+/5  Eversion: 2+5    Outcome measure  FOTO: 52/100 on 2/10/22, 72/100 on 3/21/22    Assessment: Patient has demonstrated steady progress with decreasing swelling and improving R ankle AROM and PROM in all planes since initial visit, which has improved her quality of gait mechanics for shorter distance ambulation  Although improved, patient continues to demonstrate moderate limitations in INV/EV AROM and PROM compared to the contralateral side, which contributes to pain and discomfort at the end of the day  Patient has also demonstrated good progress with improving R ankle strength in all planes, but she continues to present with weakness in her R ankle compared to the contralateral side  This limits her ability to ambulate community distances to prior capacity  Due to continued night stiffness and discomfort, patient was educated that she may remove her lace-up brace when sitting at her job duties and when at rest at home  She was educated to continue to wear her lace-up brace when ambulating  Patient is demonstrating steady progress toward her goals  She has been compliant with both PT session attendance and HEP performance  Patient would benefit from continued PT to further improve R subtalar mobility and multiplanar R ankle strength to restore a full return to active lifestyle  Goals  Patient will be independent with home exercise program - met   Patient will demonstrate decreased swelling in R ankle to improve R ankle AROM  - in progress (improved)  Patient will increase R ankle AROM to be nearly comparable to the contralateral side in all planes to improve quality of gait mechanics  - in progress (improved)  Patient will increase R ankle strength to at least 4+/5 to be able to ambulate longer distances  - in progress (improved)  Patient will be able to perform sit to stand transfers without pain  - met  Patient will be able to ambulate 30 min with modifications as necessary - in progress (improved)  Patient will be able to ride her bike  - in progress (has not attempted yet)  Patient will be able to manage symptoms independently  - in progress    Plan: 2x/week for 6 more weeks  Will taper to 1x/week as strength and mobility continue to improve       Precautions: s/p R subtalar joint debridement + peroneus longus/brevis tendon repairs (DOS: 12/23/21)- WBAT in ankle brace      Manuals 3/14 3/17 3/21 2/10 2/15 2/17 2/21 2/24 2/28 3/3 3/7 3/10    R ankle PROM  KP 4-way KP 4-way KP 4-way  KP DF/PF KP DF/PF KP 4-way KP 4-way KP 4-way KP 4-way KP 4-way KP 4-way                                                    Neuro Re-Ed                Romberg     NV           Tandem 5x ea to fatigue b/l airex EO/EC 5x ea to fatigue b/l airex EC 5x ea to fatigue b/l airex EC  NV 3x ea to fatigue b/l 5x ea to fatigue b/l 5x ea to fatigue b/l 5x ea to fatigue b/l airex 5x ea to fatigue b/l airex 5x ea to fatigue b/l airex 5x ea to fatgiue b/l airex    SLS 5x to fatigue airex EO/EC 5x to fatigue airex EC 5x to fatigue airex EC       5x to fatigue no airex 5x to fatigue airex, finger touch 5x to fatigue airex, finger touch                                                                    Ther Ex                Rec bike 5 min 5 min 5 min NV 5 min 5 min 5 min 5 min 5 min 5 min 5 min 5 min    Ankle DF/PF AROM HEP black   5"x30 HEP 5"x30 3"x20 YTB 3"x20 RTB 3"x20 GTB 3"x30 GTB 3"x30 GTB 5"x30 blue 5"x30 black    Ankle INV/EV AROM 5"x20 ea YTB 5"x30 ea YTB 5"x20 ea RTB    3"x10 ea no band 3"x20 ea no band 5"x30 ea no band 5"x30 ea no band 5"x30 ea no band 3"x10 ea YTB    Strap gastroc stretch 30"x4 HEP HEP NV 30"x4 30"x4 30"x4 30"x4 30"x4 30"x4 30"x4 30"x4    Seated heel slides 10"x10 np  NV 5"x10 10"x10 10"x10 10"x10 10"x10 10"x10 10"x10 NV    Seated heel raises 5"x30 stand 5"x30 stand 5"x30 stand     2x10 2x10 3x10 3"x20 stand 3"x30 stand    Seated towel intrinsic scoops HEP   NV 5"x20 5"x20 5"x30 5"x30 5"x30 5"x30 HEP     Standing toe raises 5"x20 5"x30 5"x30        3"x20 3"x20    BAPS 5"x30 ea 4-way L3 5"x20 ea 4-way L3 5# 5"x30 ea 4-way L3 5# NV DF/PF 3"x20 ea DF/PF L2 3"x30 ea DF/PF L2 3"x30 ea DF/PF L3 3"x30 ea DF/PF L3 5"x30 ea DF/PF L3 5"x30 ea DF/PF L3, 3"x20 ea INV/EV L1 5"x30 ea DF/PF L3, 3"x20 ea INV/EV L3 5"x30 ea 4-way L3    Wall balls squats  3"x20 3"x20             Step ups  2x10 L3 2x10 L3             Wall gastroc stretch 30"x4 30"x4 30"x4    20"x3 30"x4 30"x4 30"x4 30"x4 30"x4    Wall soleus stretch 30"x4 30"x4 30"x4        20"x3 30"x4    Weight shifts     20 ea 20 ea                          Ther Activity                                                Gait Training                                                Modalities                CP to R ankle PRN

## 2022-03-21 NOTE — LETTER
2022    Kenya Morfin MD  [facility] [address]    Patient: Deborah Purdy   YOB: 1970   Date of Visit: 3/21/2022     Encounter Diagnosis     ICD-10-CM    1  Right ankle pain, unspecified chronicity  M25 571    2  Orthopedic aftercare  Z47 89        Dear Dr Sylvester [facility]: Thank you for your recent referral of Deborah Purdy  Please review the attached evaluation summary from [facility] recent visit  Please verify that you agree with the plan of care by signing the attached order  If you have any questions or concerns, please do not hesitate to call  I sincerely appreciate the opportunity to share in the care of one of your patients and hope to have another opportunity to work with you in the near future  Sincerely,    Lyle Mares, PT      Referring Provider:      I certify that I have read the below Plan of Care and certify the need for these services furnished under this plan of treatment while under my care  Kenya Morfin MD  [address] [facility] [address]  Via Fax: [phone]          PT Re-Evaluation     Today's date: 3/21/2022  Patient name: Deborah Purdy  : 1970  MRN: [id number]  Referring provider: Bela Crawford MD  Dx:   Encounter Diagnosis     ICD-10-CM    1  Right ankle pain, unspecified chronicity  M25 571    2  Orthopedic aftercare  Z47 [id number]                   Subjective: Patient reports that she is pleased with her overall progress and has improved her ability to walk shorter distances and climb stairs since her initial visit  She continues to have a lot of stiffness and discomfort in the middle of the night, but she reports that the stiffness resolves when she does her exercises in the morning  She desires to continue to improve her ability to go down the stairs and walk longer distances   She also notes that she has been continuing to wear her lace-up brace at all times except when doing the exercises  Objective: See treatment diary below        Active Range of Motion   Left Ankle/Foot   Dorsiflexion (ke): 0 degrees   Plantar flexion: 45 degrees   Inversion: 40 degrees   Eversion: 10 degrees     Right Ankle/Foot   Dorsiflexion (ke): 0 degrees   Plantar flexion: 40 degrees   Inversion: 15 degrees   Eversion: 5 degrees     Passive Range of Motion   Left Ankle/Foot  Dorsiflexion (ke): 5 degrees   Plantar flexion: 45 degrees   Inversion: 40 degrees   Eversion: 15 degrees     Right Ankle/Foot  Dorsiflexion (ke): 2 degrees   Plantar flexion: 45 degrees   Inversion: 15 degrees   Eversion: 8 degrees     Strength  Left Ankle/Foot   Dorsiflexion: 5/5  Plantar flexion: 5/5  Inversion: 5/5  Eversion: 5/5    Right Ankle/Foot  Dorsiflexion (ke): 4+/5  Plantar flexion: 4+/5  Inversion: 2+/5  Eversion: 2+5    Outcome measure  FOTO: 52/100 on 2/10/22, 72/100 on 3/21/22    Assessment: Patient has demonstrated steady progress with decreasing swelling and improving R ankle AROM and PROM in all planes since initial visit, which has improved her quality of gait mechanics for shorter distance ambulation  Although improved, patient continues to demonstrate moderate limitations in INV/EV AROM and PROM compared to the contralateral side, which contributes to pain and discomfort at the end of the day  Patient has also demonstrated good progress with improving R ankle strength in all planes, but she continues to present with weakness in her R ankle compared to the contralateral side  This limits her ability to ambulate community distances to prior capacity  Due to continued night stiffness and discomfort, patient was educated that she may remove her lace-up brace when sitting at her job duties and when at rest at home  She was educated to continue to wear her lace-up brace when ambulating  Patient is demonstrating steady progress toward her goals  She has been compliant with both PT session attendance and HEP performance  Patient would benefit from continued PT to further improve R subtalar mobility and multiplanar R ankle strength to restore a full return to active lifestyle  Goals  Patient will be independent with home exercise program - met   Patient will demonstrate decreased swelling in R ankle to improve R ankle AROM  - in progress (improved)  Patient will increase R ankle AROM to be nearly comparable to the contralateral side in all planes to improve quality of gait mechanics  - in progress (improved)  Patient will increase R ankle strength to at least 4+/5 to be able to ambulate longer distances  - in progress (improved)  Patient will be able to perform sit to stand transfers without pain  - met  Patient will be able to ambulate 30 min with modifications as necessary - in progress (improved)  Patient will be able to ride her bike  - in progress (has not attempted yet)  Patient will be able to manage symptoms independently  - in progress    Plan: 2x/week for 6 more weeks  Will taper to 1x/week as strength and mobility continue to improve       Precautions: s/p R subtalar joint debridement + peroneus longus/brevis tendon repairs (DOS: 12/23/21)- WBAT in ankle brace      Manuals 3/14 3/17 3/21 2/10 2/15 2/17 2/21 2/24 2/28 3/3 3/7 3/10    R ankle PROM  KP 4-way KP 4-way KP 4-way  KP DF/PF KP DF/PF KP 4-way KP 4-way KP 4-way KP 4-way KP 4-way KP 4-way                                                    Neuro Re-Ed                Romberg     NV           Tandem 5x ea to fatigue b/l airex EO/EC 5x ea to fatigue b/l airex EC 5x ea to fatigue b/l airex EC  NV 3x ea to fatigue b/l 5x ea to fatigue b/l 5x ea to fatigue b/l 5x ea to fatigue b/l airex 5x ea to fatigue b/l airex 5x ea to fatigue b/l airex 5x ea to fatgiue b/l airex    SLS 5x to fatigue airex EO/EC 5x to fatigue airex EC 5x to fatigue airex EC       5x to fatigue no airex 5x to fatigue airex, finger touch 5x to fatigue airex, finger touch Ther Ex                Rec bike 5 min 5 min 5 min NV 5 min 5 min 5 min 5 min 5 min 5 min 5 min 5 min    Ankle DF/PF AROM HEP black   5"x30 HEP 5"x30 3"x20 YTB 3"x20 RTB 3"x20 GTB 3"x30 GTB 3"x30 GTB 5"x30 blue 5"x30 black    Ankle INV/EV AROM 5"x20 ea YTB 5"x30 ea YTB 5"x20 ea RTB    3"x10 ea no band 3"x20 ea no band 5"x30 ea no band 5"x30 ea no band 5"x30 ea no band 3"x10 ea YTB    Strap gastroc stretch 30"x4 HEP HEP NV 30"x4 30"x4 30"x4 30"x4 30"x4 30"x4 30"x4 30"x4    Seated heel slides 10"x10 np  NV 5"x10 10"x10 10"x10 10"x10 10"x10 10"x10 10"x10 NV    Seated heel raises 5"x30 stand 5"x30 stand 5"x30 stand     2x10 2x10 3x10 3"x20 stand 3"x30 stand    Seated towel intrinsic scoops HEP   NV 5"x20 5"x20 5"x30 5"x30 5"x30 5"x30 HEP     Standing toe raises 5"x20 5"x30 5"x30        3"x20 3"x20    BAPS 5"x30 ea 4-way L3 5"x20 ea 4-way L3 5# 5"x30 ea 4-way L3 5# NV DF/PF 3"x20 ea DF/PF L2 3"x30 ea DF/PF L2 3"x30 ea DF/PF L3 3"x30 ea DF/PF L3 5"x30 ea DF/PF L3 5"x30 ea DF/PF L3, 3"x20 ea INV/EV L1 5"x30 ea DF/PF L3, 3"x20 ea INV/EV L3 5"x30 ea 4-way L3    Wall balls squats  3"x20 3"x20             Step ups  2x10 L3 2x10 L3             Wall gastroc stretch 30"x4 30"x4 30"x4    20"x3 30"x4 30"x4 30"x4 30"x4 30"x4    Wall soleus stretch 30"x4 30"x4 30"x4        20"x3 30"x4    Weight shifts     20 ea 20 ea                          Ther Activity                                                Gait Training                                                Modalities                CP to R ankle PRN

## 2022-03-24 ENCOUNTER — OFFICE VISIT (OUTPATIENT)
Dept: PHYSICAL THERAPY | Facility: MEDICAL CENTER | Age: 52
End: 2022-03-24
Payer: COMMERCIAL

## 2022-03-24 DIAGNOSIS — Z47.89 ORTHOPEDIC AFTERCARE: ICD-10-CM

## 2022-03-24 DIAGNOSIS — M25.571 RIGHT ANKLE PAIN, UNSPECIFIED CHRONICITY: Primary | ICD-10-CM

## 2022-03-24 PROCEDURE — 97140 MANUAL THERAPY 1/> REGIONS: CPT | Performed by: PHYSICAL THERAPIST

## 2022-03-24 PROCEDURE — 97110 THERAPEUTIC EXERCISES: CPT | Performed by: PHYSICAL THERAPIST

## 2022-03-24 PROCEDURE — 97112 NEUROMUSCULAR REEDUCATION: CPT | Performed by: PHYSICAL THERAPIST

## 2022-03-24 NOTE — PROGRESS NOTES
Daily Note     Today's date: 3/24/2022  Patient name: Lala Stafford  : 1970  MRN: [id number]  Referring provider: Yordan Hill MD  Dx:   Encounter Diagnosis     ICD-10-CM    1  Right ankle pain, unspecified chronicity  M25 571    2  Orthopedic aftercare  Z47 89                   Subjective: Patient reports that her ankle is feeling better and less stiff since she started taking her ankle brace off at home  She reports less tightness at night, and she reports that she does not have any pain in her ankle when not wearing the brace  She is still wearing it for longer distance walking  Objective: See treatment diary below      Assessment: Patient continues to respond well to manual interventions with improved R ankle DF PROM post-tx  Patient demonstrated improved subtalar mobility today compared to last session  Able to progress resistance for INV/EV with tband, which demonstrates good progress toward goals  Patient demonstrated difficulty with addition of SL heel raises  Added step downs to further improve CKC mobility and strength  Patient was educated in proper set-up for soleus stretch  She was educated to continue to wear ankle brace when ambulating long community distances but to wean out of it when at rest  She was educated to wear brace if she develops pain  Patient would benefit from continued PT      Plan: Continue per plan of care        Precautions: s/p R subtalar joint debridement + peroneus longus/brevis tendon repairs (DOS: 21)- WBAT in ankle brace      Manuals 3/14 3/17 3/21 3/24    R ankle PROM  KP 4-way KP 4-way KP 4-way KP 4-way                            Neuro Re-Ed        Romberg        Tandem 5x ea to fatigue b/l airex EO/EC 5x ea to fatigue b/l airex EC 5x ea to fatigue b/l airex EC 5x ea to fatigue b/l airex EC    SLS 5x to fatigue airex EO/EC 5x to fatigue airex EC 5x to fatigue airex EC 5x to fatigue airex EC                                    Ther Ex        Rec bike 5 min 5 min 5 min 5 min    Ankle DF/PF AROM HEP black       Ankle INV/EV AROM 5"x20 ea YTB 5"x30 ea YTB 5"x20 ea RTB 5"x20 ea GTB    Strap gastroc stretch 30"x4 HEP HEP HEP    Seated heel slides 10"x10 np      Seated heel raises 5"x30 stand 5"x30 stand 5"x30 stand 5"x30 stand DL, 5"x20 stand SL    Seated towel intrinsic scoops HEP       Standing toe raises 5"x20 5"x30 5"x30 5"x30    BAPS 5"x30 ea 4-way L3 5"x20 ea 4-way L3 5# 5"x30 ea 4-way L3 5# 5"x20 ea 4-way L3 10#    Wall balls squats  3"x20 3"x20 3"x30    Step ups  2x10 L3 2x10 L3 3x10 L3    Step downs    2x10 no riser    Wall gastroc stretch 30"x4 30"x4 30"x4 30"x4    Wall soleus stretch 30"x4 30"x4 30"x4 30"x4    Weight shifts                Ther Activity                        Gait Training                        Modalities        CP to R ankle PRN

## 2022-03-28 ENCOUNTER — OFFICE VISIT (OUTPATIENT)
Dept: PHYSICAL THERAPY | Facility: MEDICAL CENTER | Age: 52
End: 2022-03-28
Payer: COMMERCIAL

## 2022-03-28 DIAGNOSIS — Z47.89 ORTHOPEDIC AFTERCARE: ICD-10-CM

## 2022-03-28 DIAGNOSIS — M25.571 RIGHT ANKLE PAIN, UNSPECIFIED CHRONICITY: Primary | ICD-10-CM

## 2022-03-28 PROCEDURE — 97110 THERAPEUTIC EXERCISES: CPT | Performed by: PHYSICAL THERAPIST

## 2022-03-28 PROCEDURE — 97140 MANUAL THERAPY 1/> REGIONS: CPT | Performed by: PHYSICAL THERAPIST

## 2022-03-28 PROCEDURE — 97112 NEUROMUSCULAR REEDUCATION: CPT | Performed by: PHYSICAL THERAPIST

## 2022-03-28 NOTE — PROGRESS NOTES
Daily Note     Today's date: 3/28/2022  Patient name: Barbie Kirkland  : 1970  MRN: [id number]  Referring provider: Thong Aponte MD  Dx:   Encounter Diagnosis     ICD-10-CM    1  Right ankle pain, unspecified chronicity  M25 571    2  Orthopedic aftercare  Z47 89                   Subjective: Patient reports that she is feeling better with less stiffness in her ankle overall  She reports that she had some swelling over the weekend after doing some walking and then sitting, but she notes that the swelling resolved yesterday  She also was able to go down more stairs today at work  Objective: See treatment diary below      Assessment: Patient continues to respond well to manual interventions with improved multiplanar R ankle PROM and AROM noted post-tx  Able to progress resistance for INV/EV with tband, which demonstrates good progress toward goals  Patient demonstrated fatigue after SL heel raises and SL step downs  Improved control noted during balance interventions  All exercises performed in a pain-free range  Patient would benefit from continued PT to progress SL ankle strengthening to be able to walk longer distances  Plan: Continue per plan of care        Precautions: s/p R subtalar joint debridement + peroneus longus/brevis tendon repairs (DOS: 21)- WBAT in ankle brace      Manuals 3/14 3/17 3/21 3/24 3/28   R ankle PROM  KP 4-way KP 4-way KP 4-way KP 4-way KP 4-way                           Neuro Re-Ed        Romberg        Tandem 5x ea to fatigue b/l airex EO/EC 5x ea to fatigue b/l airex EC 5x ea to fatigue b/l airex EC 5x ea to fatigue b/l airex EC 5x ea to fatigue b/l airex EC   SLS 5x to fatigue airex EO/EC 5x to fatigue airex EC 5x to fatigue airex EC 5x to fatigue airex EC 5x to fatigue airex EC                                   Ther Ex        Rec bike 5 min 5 min 5 min 5 min 5 min   Ankle DF/PF AROM HEP black       Ankle INV/EV AROM 5"x20 ea YTB 5"x30 ea YTB 5"x20 ea RTB 5"x20 ea GTB 5"x20 ea blue   Strap gastroc stretch 30"x4 HEP HEP HEP HEP   Seated heel slides 10"x10 np      Seated heel raises 5"x30 stand 5"x30 stand 5"x30 stand 5"x30 stand DL, 5"x20 stand SL 5"x30 stand DL, 5"x10 stand SL   Seated towel intrinsic scoops HEP       Standing toe raises 5"x20 5"x30 5"x30 5"x30 5"x30   BAPS 5"x30 ea 4-way L3 5"x20 ea 4-way L3 5# 5"x30 ea 4-way L3 5# 5"x20 ea 4-way L3 10# 5"x30 ea 4-way L3 10#   Wall balls squats  3"x20 3"x20 3"x30 3"x30   Step ups  2x10 L3 2x10 L3 3x10 L3 3x10 L3   Step downs    2x10 no riser x10 no riser   Wall gastroc stretch 30"x4 30"x4 30"x4 30"x4 HEP   Wall soleus stretch 30"x4 30"x4 30"x4 30"x4 HEP   Weight shifts                Ther Activity                        Gait Training                        Modalities        CP to R ankle PRN

## 2022-03-31 ENCOUNTER — OFFICE VISIT (OUTPATIENT)
Dept: PHYSICAL THERAPY | Facility: MEDICAL CENTER | Age: 52
End: 2022-03-31
Payer: COMMERCIAL

## 2022-03-31 DIAGNOSIS — Z47.89 ORTHOPEDIC AFTERCARE: ICD-10-CM

## 2022-03-31 DIAGNOSIS — M25.571 RIGHT ANKLE PAIN, UNSPECIFIED CHRONICITY: Primary | ICD-10-CM

## 2022-03-31 PROCEDURE — 97140 MANUAL THERAPY 1/> REGIONS: CPT | Performed by: PHYSICAL THERAPIST

## 2022-03-31 PROCEDURE — 97112 NEUROMUSCULAR REEDUCATION: CPT | Performed by: PHYSICAL THERAPIST

## 2022-03-31 PROCEDURE — 97110 THERAPEUTIC EXERCISES: CPT | Performed by: PHYSICAL THERAPIST

## 2022-03-31 NOTE — PROGRESS NOTES
Daily Note     Today's date: 3/31/2022  Patient name: Mohamud Mark  : 1970  MRN: [id number]  Referring provider: Marianne Shelton MD  Dx:   Encounter Diagnosis     ICD-10-CM    1  Right ankle pain, unspecified chronicity  M25 571    2  Orthopedic aftercare  Z47 89                   Subjective: Patient reports that she had some mild soreness after last session that subsided by the next day  She notes that her ankle is feeling looser  Objective: See treatment diary below      Assessment: Patient continues to respond well to manual interventions with improved 4-way ankle ROM post-tx  Patient demonstrated improved INV/EV ROM during weighted BAPS board, which demonstrates an improvement in stability  Improved SL control also noted during balance interventions  Patient is demonstrating excellent progress toward her goals and would benefit from continued PT to further progress CKC strengthening to be able to ambulate longer distances  Plan: Continue per plan of care        Precautions: s/p R subtalar joint debridement + peroneus longus/brevis tendon repairs (DOS: 21)- WBAT in ankle brace      Manuals 3/14 3/17 3/21 3/24 3/28 3/31   R ankle PROM  KP 4-way KP 4-way KP 4-way KP 4-way KP 4-way KP 4-way                              Neuro Re-Ed         Romberg         Tandem 5x ea to fatigue b/l airex EO/EC 5x ea to fatigue b/l airex EC 5x ea to fatigue b/l airex EC 5x ea to fatigue b/l airex EC 5x ea to fatigue b/l airex EC 5x ea to fatigue b/l airex EC   SLS 5x to fatigue airex EO/EC 5x to fatigue airex EC 5x to fatigue airex EC 5x to fatigue airex EC 5x to fatigue airex EC 5x to fatigue airex EC                                       Ther Ex         Rec bike 5 min 5 min 5 min 5 min 5 min 5 min   Ankle DF/PF AROM HEP black        Ankle INV/EV AROM 5"x20 ea YTB 5"x30 ea YTB 5"x20 ea RTB 5"x20 ea GTB 5"x20 ea blue 5"x30 ea blue   Strap gastroc stretch 30"x4 HEP HEP HEP HEP 30"x4   Seated heel slides 10"x10 np       Seated heel raises 5"x30 stand 5"x30 stand 5"x30 stand 5"x30 stand DL, 5"x20 stand SL 5"x30 stand DL, 5"x10 stand SL 5"x30 stand DL, 5"x20 stand SL   Seated towel intrinsic scoops HEP        Standing toe raises 5"x20 5"x30 5"x30 5"x30 5"x30 5"x30   BAPS 5"x30 ea 4-way L3 5"x20 ea 4-way L3 5# 5"x30 ea 4-way L3 5# 5"x20 ea 4-way L3 10# 5"x30 ea 4-way L3 10# 5"x30 ea 4-way L3 10#   Wall balls squats  3"x20 3"x20 3"x30 3"x30 3"x30   Step ups  2x10 L3 2x10 L3 3x10 L3 3x10 L3 3x10 L3   Step downs    2x10 no riser x10 no riser 2x10 no riser   Wall gastroc stretch 30"x4 30"x4 30"x4 30"x4 HEP HEP   Wall soleus stretch 30"x4 30"x4 30"x4 30"x4 HEP HEP   Weight shifts                  Ther Activity                           Gait Training                           Modalities         CP to R ankle PRN

## 2022-04-04 ENCOUNTER — OFFICE VISIT (OUTPATIENT)
Dept: PHYSICAL THERAPY | Facility: MEDICAL CENTER | Age: 52
End: 2022-04-04
Payer: COMMERCIAL

## 2022-04-04 DIAGNOSIS — Z47.89 ORTHOPEDIC AFTERCARE: ICD-10-CM

## 2022-04-04 DIAGNOSIS — M25.571 RIGHT ANKLE PAIN, UNSPECIFIED CHRONICITY: Primary | ICD-10-CM

## 2022-04-04 PROCEDURE — 97112 NEUROMUSCULAR REEDUCATION: CPT | Performed by: PHYSICAL THERAPIST

## 2022-04-04 PROCEDURE — 97110 THERAPEUTIC EXERCISES: CPT | Performed by: PHYSICAL THERAPIST

## 2022-04-04 PROCEDURE — 97140 MANUAL THERAPY 1/> REGIONS: CPT | Performed by: PHYSICAL THERAPIST

## 2022-04-04 NOTE — PROGRESS NOTES
Daily Note     Today's date: 2022  Patient name: Rach Barnett  : 1970  MRN: [id number]  Referring provider: Luma Carias MD  Dx:   Encounter Diagnosis     ICD-10-CM    1  Right ankle pain, unspecified chronicity  M25 571    2  Orthopedic aftercare  Z47 89                   Subjective: Patient reports that she had some soreness after last session and some soreness over the weekend after doing more standing and walking  She is pleased with her overall progress and is able to stand and walk for longer periods of time than previously  Objective: See treatment diary below      Assessment: Patient demonstrated a positive response to manuals with improved R ankle AROM and PROM in all planes post-tx  Held progressions for CKC strengthening due to patient presenting without ankle lace-up brace  However, patient was able to complete her current program without pain and without use of brace, which demonstrates good progress toward goals  Cueing provided to encourage eccentric control during step downs  Patient demonstrated fatigue at end of session  All exercises performed in a pain-free range  Patient would benefit from continued PT to progress CKC strengthening and balance to return to active lifestyle  Plan: Continue per plan of care        Precautions: s/p R subtalar joint debridement + peroneus longus/brevis tendon repairs (DOS: 21)- WBAT in ankle brace      Manuals 3/14 3/17 3/21 3/24 3/28 3/31 4/4   R ankle PROM  KP 4-way KP 4-way KP 4-way KP 4-way KP 4-way KP 4-way KP 4-way                                 Neuro Re-Ed          Romberg          Tandem 5x ea to fatigue b/l airex EO/EC 5x ea to fatigue b/l airex EC 5x ea to fatigue b/l airex EC 5x ea to fatigue b/l airex EC 5x ea to fatigue b/l airex EC 5x ea to fatigue b/l airex EC 5x ea to fatigue b/l airex EC   SLS 5x to fatigue airex EO/EC 5x to fatigue airex EC 5x to fatigue airex EC 5x to fatigue airex EC 5x to fatigue airex EC 5x to fatigue airex EC 5x to fatigue airex EC                                           Ther Ex          Rec bike 5 min 5 min 5 min 5 min 5 min 5 min 5 min   Ankle DF/PF AROM HEP black         Ankle INV/EV AROM 5"x20 ea YTB 5"x30 ea YTB 5"x20 ea RTB 5"x20 ea GTB 5"x20 ea blue 5"x30 ea blue HEP   Strap gastroc stretch 30"x4 HEP HEP HEP HEP 30"x4 30"x5   Seated heel slides 10"x10 np        Seated heel raises 5"x30 stand 5"x30 stand 5"x30 stand 5"x30 stand DL, 5"x20 stand SL 5"x30 stand DL, 5"x10 stand SL 5"x30 stand DL, 5"x20 stand SL 5"x30 stand DL, 5"x10 stand SL   Seated towel intrinsic scoops HEP         Standing toe raises 5"x20 5"x30 5"x30 5"x30 5"x30 5"x30 5"x30   BAPS 5"x30 ea 4-way L3 5"x20 ea 4-way L3 5# 5"x30 ea 4-way L3 5# 5"x20 ea 4-way L3 10# 5"x30 ea 4-way L3 10# 5"x30 ea 4-way L3 10# 5"x30 ea 4-way L3 10#   Wall balls squats  3"x20 3"x20 3"x30 3"x30 3"x30 3"x30   Step ups  2x10 L3 2x10 L3 3x10 L3 3x10 L3 3x10 L3 3x10 L3   Step downs    2x10 no riser x10 no riser 2x10 no riser 2x10 no riser   Wall gastroc stretch 30"x4 30"x4 30"x4 30"x4 HEP HEP    Wall soleus stretch 30"x4 30"x4 30"x4 30"x4 HEP HEP    Weight shifts                    Ther Activity                              Gait Training                              Modalities          CP to R ankle PRN

## 2022-04-07 ENCOUNTER — OFFICE VISIT (OUTPATIENT)
Dept: PHYSICAL THERAPY | Facility: MEDICAL CENTER | Age: 52
End: 2022-04-07
Payer: COMMERCIAL

## 2022-04-07 DIAGNOSIS — Z47.89 ORTHOPEDIC AFTERCARE: ICD-10-CM

## 2022-04-07 DIAGNOSIS — M25.571 RIGHT ANKLE PAIN, UNSPECIFIED CHRONICITY: Primary | ICD-10-CM

## 2022-04-07 PROCEDURE — 97110 THERAPEUTIC EXERCISES: CPT | Performed by: PHYSICAL THERAPIST

## 2022-04-07 PROCEDURE — 97112 NEUROMUSCULAR REEDUCATION: CPT | Performed by: PHYSICAL THERAPIST

## 2022-04-07 PROCEDURE — 97140 MANUAL THERAPY 1/> REGIONS: CPT | Performed by: PHYSICAL THERAPIST

## 2022-04-07 NOTE — PROGRESS NOTES
Daily Note     Today's date: 2022  Patient name: Stefan Garcia  : 1970  MRN: [id number]  Referring provider: Maxine Kimball MD  Dx:   Encounter Diagnosis     ICD-10-CM    1  Right ankle pain, unspecified chronicity  M25 571    2  Orthopedic aftercare  Z47 89                   Subjective: Patient reports that her ankle is feeling better with less stiffness and pain at night  She notes that she did not have any soreness after last session  Objective: See treatment diary below      Assessment: Patient demonstrated improvements in R ankle PROM today in all planes compared to last session  She also demonstrated improved R ankle CKC PF ROM during SL heel raises, which indicates that she is improving in strength  Added DL/SL leg press today to further improve CKC strength  Improved control noted during balance interventions  No pain reported during or after session  Patient was educated to wear her lace-up brace when ambulating longer community distances this upcoming weekend  Patient would benefit from continued PT to progress LE strengthening interventions to return to active lifestyle  Plan: Continue per plan of care        Precautions: s/p R subtalar joint debridement + peroneus longus/brevis tendon repairs (DOS: 21)- WBAT in ankle brace      Manuals 3/14 3/17 3/21 3/24 3/28 3/31 4/4 4/7   R ankle PROM  KP 4-way KP 4-way KP 4-way KP 4-way KP 4-way KP 4-way KP 4-way KP 4-way                                    Neuro Re-Ed           Romberg           Tandem 5x ea to fatigue b/l airex EO/EC 5x ea to fatigue b/l airex EC 5x ea to fatigue b/l airex EC 5x ea to fatigue b/l airex EC 5x ea to fatigue b/l airex EC 5x ea to fatigue b/l airex EC 5x ea to fatigue b/l airex EC 5x ea to fatigue b/l airex EC   SLS 5x to fatigue airex EO/EC 5x to fatigue airex EC 5x to fatigue airex EC 5x to fatigue airex EC 5x to fatigue airex EC 5x to fatigue airex EC 5x to fatigue airex EC 5x to fatigue airex EC Ther Ex           Rec bike 5 min 5 min 5 min 5 min 5 min 5 min 5 min 5 min   Ankle DF/PF AROM HEP black          Ankle INV/EV AROM 5"x20 ea YTB 5"x30 ea YTB 5"x20 ea RTB 5"x20 ea GTB 5"x20 ea blue 5"x30 ea blue HEP HEP   Strap gastroc stretch 30"x4 HEP HEP HEP HEP 30"x4 30"x5 30"x4   Seated heel slides 10"x10 np         Seated heel raises 5"x30 stand 5"x30 stand 5"x30 stand 5"x30 stand DL, 5"x20 stand SL 5"x30 stand DL, 5"x10 stand SL 5"x30 stand DL, 5"x20 stand SL 5"x30 stand DL, 5"x10 stand SL 5"x30 stand DL, 5"x20 stand SL   Seated towel intrinsic scoops HEP          Standing toe raises 5"x20 5"x30 5"x30 5"x30 5"x30 5"x30 5"x30 5"x30   BAPS 5"x30 ea 4-way L3 5"x20 ea 4-way L3 5# 5"x30 ea 4-way L3 5# 5"x20 ea 4-way L3 10# 5"x30 ea 4-way L3 10# 5"x30 ea 4-way L3 10# 5"x30 ea 4-way L3 10# np   Wall balls squats  3"x20 3"x20 3"x30 3"x30 3"x30 3"x30 3"x30   Step ups  2x10 L3 2x10 L3 3x10 L3 3x10 L3 3x10 L3 3x10 L3 3x10 L3   Step downs    2x10 no riser x10 no riser 2x10 no riser 2x10 no riser 2x10 no riser   Leg press        2x10 DL 70#, 2x10 SL 30#   Wall gastroc stretch 30"x4 30"x4 30"x4 30"x4 HEP HEP     Wall soleus stretch 30"x4 30"x4 30"x4 30"x4 HEP HEP     Weight shifts                      Ther Activity                                 Gait Training                                 Modalities           CP to R ankle PRN

## 2022-04-11 ENCOUNTER — OFFICE VISIT (OUTPATIENT)
Dept: PHYSICAL THERAPY | Facility: MEDICAL CENTER | Age: 52
End: 2022-04-11
Payer: COMMERCIAL

## 2022-04-11 DIAGNOSIS — Z47.89 ORTHOPEDIC AFTERCARE: ICD-10-CM

## 2022-04-11 DIAGNOSIS — M25.571 RIGHT ANKLE PAIN, UNSPECIFIED CHRONICITY: Primary | ICD-10-CM

## 2022-04-11 PROCEDURE — 97112 NEUROMUSCULAR REEDUCATION: CPT | Performed by: PHYSICAL THERAPIST

## 2022-04-11 PROCEDURE — 97140 MANUAL THERAPY 1/> REGIONS: CPT | Performed by: PHYSICAL THERAPIST

## 2022-04-11 PROCEDURE — 97110 THERAPEUTIC EXERCISES: CPT | Performed by: PHYSICAL THERAPIST

## 2022-04-11 NOTE — PROGRESS NOTES
Daily Note     Today's date: 2022  Patient name: Sondra Marie  : 1970  MRN: [id number]  Referring provider: Rossy Elena MD  Dx:   Encounter Diagnosis     ICD-10-CM    1  Right ankle pain, unspecified chronicity  M25 571    2  Orthopedic aftercare  Z47 89                   Subjective: Patient reports that she was able to walk 8,000 steps yesterday on some hilly surfaces  She reports that she had some soreness after this but no significant pain  She was able to go for another shorter walk today, and she still has some slight soreness  She is wearing her brace for longer distance walking  Objective: See treatment diary below      Assessment: Patient responds well to manuals with improved R ankle AROM and PROM in all planes post-tx  Held progressions for CKC strengthening interventions due to baseline soreness today  Patient demonstrated improved form during lateral step downs today with improved R ankle DF CKC noted  Patient demonstrated fatigue at end of session  All exercises performed in a pain-free range  Patient would benefit from continued PT to further progress R ankle CKC mobility and strengthening to improve LE strength and endurance during long distance ambulation  Plan: Continue per plan of care        Precautions: s/p R subtalar joint debridement + peroneus longus/brevis tendon repairs (DOS: 21)- WBAT in ankle brace      Manuals 3/14 3/17 3/21 3/24 3/28 3/31 4/4 4/7 4/11   R ankle PROM  KP 4-way KP 4-way KP 4-way KP 4-way KP 4-way KP 4-way KP 4-way KP 4-way KP 4-way                                       Neuro Re-Ed            Romberg            Tandem 5x ea to fatigue b/l airex EO/EC 5x ea to fatigue b/l airex EC 5x ea to fatigue b/l airex EC 5x ea to fatigue b/l airex EC 5x ea to fatigue b/l airex EC 5x ea to fatigue b/l airex EC 5x ea to fatigue b/l airex EC 5x ea to fatigue b/l airex EC 5x ea to fatigue b/l airex EC   SLS 5x to fatigue airex EO/EC 5x to fatigue airex EC 5x to fatigue airex EC 5x to fatigue airex EC 5x to fatigue airex EC 5x to fatigue airex EC 5x to fatigue airex EC 5x to fatigue airex EC 5x to fatigue airex EC                                                   Ther Ex            Rec bike 5 min 5 min 5 min 5 min 5 min 5 min 5 min 5 min 5 min   Ankle DF/PF AROM HEP black           Ankle INV/EV AROM 5"x20 ea YTB 5"x30 ea YTB 5"x20 ea RTB 5"x20 ea GTB 5"x20 ea blue 5"x30 ea blue HEP HEP    Strap gastroc stretch 30"x4 HEP HEP HEP HEP 30"x4 30"x5 30"x4 30"x4   Seated heel slides 10"x10 np          Seated heel raises 5"x30 stand 5"x30 stand 5"x30 stand 5"x30 stand DL, 5"x20 stand SL 5"x30 stand DL, 5"x10 stand SL 5"x30 stand DL, 5"x20 stand SL 5"x30 stand DL, 5"x10 stand SL 5"x30 stand DL, 5"x20 stand SL 5"x30 stand DL, 5"x20 stand SL   Seated towel intrinsic scoops HEP           Standing toe raises 5"x20 5"x30 5"x30 5"x30 5"x30 5"x30 5"x30 5"x30 5"x30   BAPS 5"x30 ea 4-way L3 5"x20 ea 4-way L3 5# 5"x30 ea 4-way L3 5# 5"x20 ea 4-way L3 10# 5"x30 ea 4-way L3 10# 5"x30 ea 4-way L3 10# 5"x30 ea 4-way L3 10# np    Wall balls squats  3"x20 3"x20 3"x30 3"x30 3"x30 3"x30 3"x30 3"x30   Step ups  2x10 L3 2x10 L3 3x10 L3 3x10 L3 3x10 L3 3x10 L3 3x10 L3 3x10 L3   Step downs    2x10 no riser x10 no riser 2x10 no riser 2x10 no riser 2x10 no riser 2x10 no riser   Leg press        2x10 DL 70#, 2x10 SL 30# 2x10 DL 70#, 2x10 SL 30#   Wall gastroc stretch 30"x4 30"x4 30"x4 30"x4 HEP HEP      Wall soleus stretch 30"x4 30"x4 30"x4 30"x4 HEP HEP      Weight shifts                        Ther Activity                                    Gait Training                                    Modalities            CP to R ankle PRN

## 2022-04-14 ENCOUNTER — OFFICE VISIT (OUTPATIENT)
Dept: PHYSICAL THERAPY | Facility: MEDICAL CENTER | Age: 52
End: 2022-04-14
Payer: COMMERCIAL

## 2022-04-14 DIAGNOSIS — M25.571 RIGHT ANKLE PAIN, UNSPECIFIED CHRONICITY: Primary | ICD-10-CM

## 2022-04-14 DIAGNOSIS — Z47.89 ORTHOPEDIC AFTERCARE: ICD-10-CM

## 2022-04-14 PROCEDURE — 97110 THERAPEUTIC EXERCISES: CPT | Performed by: PHYSICAL THERAPIST

## 2022-04-14 PROCEDURE — 97112 NEUROMUSCULAR REEDUCATION: CPT | Performed by: PHYSICAL THERAPIST

## 2022-04-14 PROCEDURE — 97140 MANUAL THERAPY 1/> REGIONS: CPT | Performed by: PHYSICAL THERAPIST

## 2022-04-14 NOTE — PROGRESS NOTES
Daily Note     Today's date: 2022  Patient name: Alyssa Sebastian  : 1970  MRN: [id number]  Referring provider: Kaye Yanez MD  Dx:   Encounter Diagnosis     ICD-10-CM    1  Right ankle pain, unspecified chronicity  M25 571    2  Orthopedic aftercare  Z47 89                   Subjective: Patient reports that her ankle felt pretty good after last session, and she has been able to go for walks on her lunch break  She is pleased with her overall progress, but her ankle becomes sore after a lot of activity  Objective: See treatment diary below      Assessment: Patient continues to respond well to manuals with improved R ankle PROM in all planes post-tx  Patient was challenged with addition of UE movement to balance interventions but was able to complete intervention without pain  Able to progress resistance for both DL and SL leg press, which demonstrates an improvement in CKC ankle strength  Due to good progress toward goals, plan to decrease frequency to 1x/week to focus on strengthening progressions to restore full ability to ambulate longer distances and participate in recreational activities  Goals  Patient will be independent with home exercise program - met   Patient will demonstrate decreased swelling in R ankle to improve R ankle AROM  - in progress (improved)  Patient will increase R ankle AROM to be nearly comparable to the contralateral side in all planes to improve quality of gait mechanics  - in progress (improved)  Patient will increase R ankle strength to at least 4+/5 to be able to ambulate longer distances  - in progress (improved)  Patient will be able to perform sit to stand transfers without pain  - met  Patient will be able to ambulate 30 min with modifications as necessary - in progress (improved)  Patient will be able to ride her bike  - in progress (has not attempted yet)  Patient will be able to manage symptoms independently  - in progress    Plan: Continue per POC   Due to good progress toward goals, will decrease frequency to 1x/week       Precautions: s/p R subtalar joint debridement + peroneus longus/brevis tendon repairs (DOS: 12/23/21)- WBAT in ankle brace      Manuals 3/14 3/17 3/21 3/24 3/28 3/31 4/4 4/7 4/11 4/14   R ankle PROM  KP 4-way KP 4-way KP 4-way KP 4-way KP 4-way KP 4-way KP 4-way KP 4-way KP 4-way KP 4-way                                          Neuro Re-Ed             Romberg             Tandem 5x ea to fatigue b/l airex EO/EC 5x ea to fatigue b/l airex EC 5x ea to fatigue b/l airex EC 5x ea to fatigue b/l airex EC 5x ea to fatigue b/l airex EC 5x ea to fatigue b/l airex EC 5x ea to fatigue b/l airex EC 5x ea to fatigue b/l airex EC 5x ea to fatigue b/l airex EC 1x alpha ea airex EO   SLS 5x to fatigue airex EO/EC 5x to fatigue airex EC 5x to fatigue airex EC 5x to fatigue airex EC 5x to fatigue airex EC 5x to fatigue airex EC 5x to fatigue airex EC 5x to fatigue airex EC 5x to fatigue airex EC 5x to fatigue airex EC                                                       Ther Ex             Rec bike 5 min 5 min 5 min 5 min 5 min 5 min 5 min 5 min 5 min 5 min   Ankle DF/PF AROM HEP black            Ankle INV/EV AROM 5"x20 ea YTB 5"x30 ea YTB 5"x20 ea RTB 5"x20 ea GTB 5"x20 ea blue 5"x30 ea blue HEP HEP     Strap gastroc stretch 30"x4 HEP HEP HEP HEP 30"x4 30"x5 30"x4 30"x4 HEP   Seated heel slides 10"x10 np           Seated heel raises 5"x30 stand 5"x30 stand 5"x30 stand 5"x30 stand DL, 5"x20 stand SL 5"x30 stand DL, 5"x10 stand SL 5"x30 stand DL, 5"x20 stand SL 5"x30 stand DL, 5"x10 stand SL 5"x30 stand DL, 5"x20 stand SL 5"x30 stand DL, 5"x20 stand SL 5"x30 stand DL, 5"x30 stand SL   Seated towel intrinsic scoops HEP            Standing toe raises 5"x20 5"x30 5"x30 5"x30 5"x30 5"x30 5"x30 5"x30 5"x30 5"x30   BAPS 5"x30 ea 4-way L3 5"x20 ea 4-way L3 5# 5"x30 ea 4-way L3 5# 5"x20 ea 4-way L3 10# 5"x30 ea 4-way L3 10# 5"x30 ea 4-way L3 10# 5"x30 ea 4-way L3 10# np Wall balls squats  3"x20 3"x20 3"x30 3"x30 3"x30 3"x30 3"x30 3"x30 2x10 5#   Step ups  2x10 L3 2x10 L3 3x10 L3 3x10 L3 3x10 L3 3x10 L3 3x10 L3 3x10 L3 3x10 L3   Step downs    2x10 no riser x10 no riser 2x10 no riser 2x10 no riser 2x10 no riser 2x10 no riser 3x10 no riser   Leg press        2x10 DL 70#, 2x10 SL 30# 2x10 DL 70#, 2x10 SL 30# 2x10 DL 80#, 2x10 SL 60#   Wall gastroc stretch 30"x4 30"x4 30"x4 30"x4 HEP HEP       Wall soleus stretch 30"x4 30"x4 30"x4 30"x4 HEP HEP       Weight shifts                          Ther Activity                                       Gait Training                                       Modalities             CP to R ankle PRN

## 2022-04-18 ENCOUNTER — OFFICE VISIT (OUTPATIENT)
Dept: PHYSICAL THERAPY | Facility: MEDICAL CENTER | Age: 52
End: 2022-04-18
Payer: COMMERCIAL

## 2022-04-18 DIAGNOSIS — M25.571 RIGHT ANKLE PAIN, UNSPECIFIED CHRONICITY: Primary | ICD-10-CM

## 2022-04-18 DIAGNOSIS — Z47.89 ORTHOPEDIC AFTERCARE: ICD-10-CM

## 2022-04-18 PROCEDURE — 97140 MANUAL THERAPY 1/> REGIONS: CPT | Performed by: PHYSICAL THERAPIST

## 2022-04-18 PROCEDURE — 97110 THERAPEUTIC EXERCISES: CPT | Performed by: PHYSICAL THERAPIST

## 2022-04-18 PROCEDURE — 97112 NEUROMUSCULAR REEDUCATION: CPT | Performed by: PHYSICAL THERAPIST

## 2022-04-18 NOTE — PROGRESS NOTES
Daily Note     Today's date: 2022  Patient name: Caleb Davies  : 1970  MRN: [id number]  Referring provider: Popeye Nguyen MD  Dx:   Encounter Diagnosis     ICD-10-CM    1  Right ankle pain, unspecified chronicity  M25 571    2  Orthopedic aftercare  Z47 89                   Subjective: Patient reports that she did not have any soreness after last session  She reports that she was able to mow her grass over the weekend and walk 10,000 steps on uneven surfaces  She did have some soreness and stiffness after mowing, but it resolved with Advil  She has a follow-up with her physician this upcoming Friday (22)  Objective: See treatment diary below      Assessment: Patient continues to present with moderate INV/EV hypomobility  However, she responds well to manuals with improved multiplanar R ankle PROM post-tx  INV/EV AROM also has been progressively improving over the last few weeks  Patient  is demonstrating improved CKC R ankle PF ROM during SL heel raises  She demonstrated difficulty with addition of UE movement to balance interventions but was able to complete intervention without pain  Cueing provided to prevent excessive anterior tibial translation during wall ball squats  All exercises performed in a pain-free range  Patient would benefit from continued PT to progress ankle mobility and stability interventions to return to full participation in recreational activities, such as hiking and biking  Plan: Continue per plan of care  1x/week       Precautions: s/p R subtalar joint debridement + peroneus longus/brevis tendon repairs (DOS: 21)- WBAT in ankle brace      Manuals 4/18 3/14 3/17 3/21 3/24 3/28 3/31 4/4 4/7 4/11 4/14   R ankle PROM  KP 4-way KP 4-way KP 4-way KP 4-way KP 4-way KP 4-way KP 4-way KP 4-way KP 4-way KP 4-way KP 4-way                                             Neuro Re-Ed              Romberg              Tandem 1x alpha ea airex 5x ea to fatigue b/l airex EO/EC 5x ea to fatigue b/l airex EC 5x ea to fatigue b/l airex EC 5x ea to fatigue b/l airex EC 5x ea to fatigue b/l airex EC 5x ea to fatigue b/l airex EC 5x ea to fatigue b/l airex EC 5x ea to fatigue b/l airex EC 5x ea to fatigue b/l airex EC 1x alpha ea airex EO   SLS 5x to fatigue airex EC 5x to fatigue airex EO/EC 5x to fatigue airex EC 5x to fatigue airex EC 5x to fatigue airex EC 5x to fatigue airex EC 5x to fatigue airex EC 5x to fatigue airex EC 5x to fatigue airex EC 5x to fatigue airex EC 5x to fatigue airex EC                                                           Ther Ex              Rec bike 5 min 5 min 5 min 5 min 5 min 5 min 5 min 5 min 5 min 5 min 5 min   Ankle DF/PF AROM  HEP black            Ankle INV/EV AROM  5"x20 ea YTB 5"x30 ea YTB 5"x20 ea RTB 5"x20 ea GTB 5"x20 ea blue 5"x30 ea blue HEP HEP     Strap gastroc stretch  30"x4 HEP HEP HEP HEP 30"x4 30"x5 30"x4 30"x4 HEP   Seated heel slides  10"x10 np           Seated heel raises 5"x30 stand DL, 5"x30 stand SL 5"x30 stand 5"x30 stand 5"x30 stand 5"x30 stand DL, 5"x20 stand SL 5"x30 stand DL, 5"x10 stand SL 5"x30 stand DL, 5"x20 stand SL 5"x30 stand DL, 5"x10 stand SL 5"x30 stand DL, 5"x20 stand SL 5"x30 stand DL, 5"x20 stand SL 5"x30 stand DL, 5"x30 stand SL   Seated towel intrinsic scoops  HEP            Standing toe raises 5"x30 5"x20 5"x30 5"x30 5"x30 5"x30 5"x30 5"x30 5"x30 5"x30 5"x30   BAPS  5"x30 ea 4-way L3 5"x20 ea 4-way L3 5# 5"x30 ea 4-way L3 5# 5"x20 ea 4-way L3 10# 5"x30 ea 4-way L3 10# 5"x30 ea 4-way L3 10# 5"x30 ea 4-way L3 10# np     Wall balls squats x30 5#  3"x20 3"x20 3"x30 3"x30 3"x30 3"x30 3"x30 3"x30 2x10 5#   Step ups 3x10 L3  2x10 L3 2x10 L3 3x10 L3 3x10 L3 3x10 L3 3x10 L3 3x10 L3 3x10 L3 3x10 L3   Step downs 2x10 no riser    2x10 no riser x10 no riser 2x10 no riser 2x10 no riser 2x10 no riser 2x10 no riser 3x10 no riser   Leg press 2x10 DL 80#, 2x10 SL 60#        2x10 DL 70#, 2x10 SL 30# 2x10 DL 70#, 2x10 SL 30# 2x10 DL 80#, 2x10 SL 60#   Wall gastroc stretch  30"x4 30"x4 30"x4 30"x4 HEP HEP       Wall soleus stretch  30"x4 30"x4 30"x4 30"x4 HEP HEP       Weight shifts                            Ther Activity                                          Gait Training                                          Modalities              CP to R ankle PRN

## 2022-04-21 ENCOUNTER — APPOINTMENT (OUTPATIENT)
Dept: PHYSICAL THERAPY | Facility: MEDICAL CENTER | Age: 52
End: 2022-04-21
Payer: COMMERCIAL

## 2022-04-25 ENCOUNTER — OFFICE VISIT (OUTPATIENT)
Dept: PHYSICAL THERAPY | Facility: MEDICAL CENTER | Age: 52
End: 2022-04-25
Payer: COMMERCIAL

## 2022-04-25 DIAGNOSIS — Z47.89 ORTHOPEDIC AFTERCARE: ICD-10-CM

## 2022-04-25 DIAGNOSIS — M25.571 RIGHT ANKLE PAIN, UNSPECIFIED CHRONICITY: Primary | ICD-10-CM

## 2022-04-25 PROCEDURE — 97112 NEUROMUSCULAR REEDUCATION: CPT | Performed by: PHYSICAL THERAPIST

## 2022-04-25 PROCEDURE — 97110 THERAPEUTIC EXERCISES: CPT | Performed by: PHYSICAL THERAPIST

## 2022-04-25 PROCEDURE — 97140 MANUAL THERAPY 1/> REGIONS: CPT | Performed by: PHYSICAL THERAPIST

## 2022-04-25 NOTE — PROGRESS NOTES
Daily Note     Today's date: 2022  Patient name: Kajal Manuel  : 1970  MRN: [id number]  Referring provider: Jose Coughlin MD  Dx:   Encounter Diagnosis     ICD-10-CM    1  Right ankle pain, unspecified chronicity  M25 571    2  Orthopedic aftercare  Z47 89                   Subjective: Patient reports that she has some stiffness in the morning and some soreness after being on her feet for long periods of time  However, she is pleased with her overall progress and is continuing to get stronger  She reports that she saw her physician last week, and they are also pleased with her progress  Objective: See treatment diary below      Assessment: Patient continues to respond well to manual interventions with improved R ankle multiplanar PROM post-tx  Patient demonstrated significantly improved control during balance interventions today  In addition, she is demonstrating improved eccentric control during SL heel raises  Able to progress resistance for leg press, which further demonstrates an improvement in strength  Due to good progress toward goals, plan to hold on formal PT for 2 weeks for patient to continue her progressive strengthening HEP  Plan to re-evaluate in 2 weeks for potential d/c to HEP  Plan: Due to good progress toward goals, plan to hold on formal PT for 2 weeks to work on progressive strengthening/mobility HEP  Potential d/c next visit      Precautions: s/p R subtalar joint debridement + peroneus longus/brevis tendon repairs (DOS: 21)- WBAT in ankle brace      Manuals    R ankle PROM  KP 4-way KP 4-way                  Neuro Re-Ed     Romberg     Tandem 1x alpha ea airex 2x alpha ea airex    SLS 5x to fatigue airex EC 5x to fatigue airex EC                       Ther Ex     Rec bike 5 min 5 min   Seated heel raises 5"x30 stand DL, 5"x30 stand SL 5"x30 stand DL, 5"x30 stand SL   Standing toe raises 5"x30 5"x30   Wall balls squats x30 5# x30 5#   Step ups 3x10 L3 3x10 L3   Step downs 2x10 no riser 2x10 L1   Leg press 2x10 DL 80#, 2x10 SL 60# 2x10 DL 90#, 2x10 SL 70#        Ther Activity               Gait Training               Modalities     CP to R ankle PRN

## 2022-04-28 ENCOUNTER — APPOINTMENT (OUTPATIENT)
Dept: PHYSICAL THERAPY | Facility: MEDICAL CENTER | Age: 52
End: 2022-04-28
Payer: COMMERCIAL

## 2022-05-03 ENCOUNTER — APPOINTMENT (OUTPATIENT)
Dept: PHYSICAL THERAPY | Facility: MEDICAL CENTER | Age: 52
End: 2022-05-03
Payer: COMMERCIAL

## 2022-05-09 ENCOUNTER — OFFICE VISIT (OUTPATIENT)
Dept: PHYSICAL THERAPY | Facility: MEDICAL CENTER | Age: 52
End: 2022-05-09
Payer: COMMERCIAL

## 2022-05-09 DIAGNOSIS — M25.571 RIGHT ANKLE PAIN, UNSPECIFIED CHRONICITY: Primary | ICD-10-CM

## 2022-05-09 DIAGNOSIS — Z47.89 ORTHOPEDIC AFTERCARE: ICD-10-CM

## 2022-05-09 PROCEDURE — 97110 THERAPEUTIC EXERCISES: CPT | Performed by: PHYSICAL THERAPIST

## 2022-05-09 PROCEDURE — 97140 MANUAL THERAPY 1/> REGIONS: CPT | Performed by: PHYSICAL THERAPIST

## 2022-05-09 NOTE — PROGRESS NOTES
Discharge Summary     Today's date: 2022  Patient name: Marinan Orozco  : 1970  MRN: [id number]  Referring provider: Ema Remy MD  Dx:   Encounter Diagnosis     ICD-10-CM    1  Right ankle pain, unspecified chronicity  M25 571    2  Orthopedic aftercare  Z47 89                   Subjective: Patient reports that her ankle is feeling much improved  She was able to walk almost 24,000 steps in 1 day over the past weekend and was able to do a hike that involved a lot of stair climbing  She notes that she is also able to ride her bike without difficulty  She sometimes has some stiffness in her ankle, but she is very pleased with her overall progress  She feels confident that her exercises will help her to continue to work on her motion and strength  She also started using the gym at work to perform more of her strengthening interventions  She feels ready to be discharged to her HEP  Objective: See treatment diary below      Assessment: Patient has demonstrated steady progress with improving R ankle AROM and PROM nearly 4 5 months s/p R subtalar debridement and peroneus brevis/longus repairs  This has improved her quality of gait mechanics and has facilitated an improvement with her ability to perform sit to stand transfers during ADL  Patient demonstrates mild limitations in R ankle INV/EV AROM and PROM compared to the contralateral side  However, she is independent in a comprehensive illustrated HEP for continued mobility and strengthening  In addition, patient has demonstrated good progress with improving multiplanar R ankle strength, which has allowed her to increase her distance of community ambulation  Patient has been compliant with both PT session attendance and HEP performance  Patient has met all of her goals for PT except for improving INV/EV AROM to be comparable to the contralateral side   She is independent in a comprehensive illustrated HEP for continued ankle mobility and progressive strengthening  Patient has reached her maximum benefit from PT services and is agreeable to be discharged  She is now discharged to her [facility]  Goals  Patient will be independent with home exercise program - met   Patient will demonstrate decreased swelling in R ankle to improve R ankle AROM  - met  Patient will increase R ankle AROM to be nearly comparable to the contralateral side in all planes to improve quality of gait mechanics  - improved (continues to be limited in R ankle INV/EV AROM; however, this is improved and patient is independent in illustrated [facility] for continued mobility)  Patient will increase R ankle strength to at least 4+/5 to be able to ambulate longer distances  - met  Patient will be able to perform sit to stand transfers without pain  - met  Patient will be able to ambulate 30 min with modifications as necessary - met  Patient will be able to ride her bike  - met  Patient will be able to manage symptoms independently  - met      Plan: Discharge to [facility]       Precautions: s/p R subtalar joint debridement + peroneus longus/brevis tendon repairs (DOS: 12/23/21)- WBAT in ankle brace        Manuals 4/18 4/25 5/9   R ankle PROM  KP 4-way KP 4-way KP 4-way                     Neuro Re-Ed      Romberg      Tandem 1x alpha ea airex 2x alpha ea airex  1x alpha ea airex   SLS 5x to fatigue airex EC 5x to fatigue airex EC 5x to fatigue airex EC                           Ther Ex      Rec bike 5 min 5 min 5 min   Strap gastroc stretch   30"x4   Seated heel raises 5"x30 stand DL, 5"x30 stand SL 5"x30 stand DL, 5"x30 stand SL 5"x30 stand DL, 5"x30 stand SL   Standing toe raises 5"x30 5"x30 5"x30   Wall balls squats x30 5# x30 5# x30 5#   Step ups 3x10 L3 3x10 L3 HEP   Step downs 2x10 no riser 2x10 L1 np   Leg press 2x10 DL 80#, 2x10 SL 60# 2x10 DL 90#, 2x10 SL 70# HEP review   HEP education and instruction   10'   Ther Activity                  Gait Training                  Modalities      CP to R ankle PRN

## 2022-05-16 ENCOUNTER — APPOINTMENT (OUTPATIENT)
Dept: PHYSICAL THERAPY | Facility: MEDICAL CENTER | Age: 52
End: 2022-05-16
Payer: COMMERCIAL

## 2023-12-01 ENCOUNTER — OFFICE VISIT (OUTPATIENT)
Dept: URGENT CARE | Facility: MEDICAL CENTER | Age: 53
End: 2023-12-01
Payer: COMMERCIAL

## 2023-12-01 ENCOUNTER — APPOINTMENT (OUTPATIENT)
Dept: RADIOLOGY | Facility: MEDICAL CENTER | Age: 53
End: 2023-12-01
Payer: COMMERCIAL

## 2023-12-01 VITALS
TEMPERATURE: 97.3 F | BODY MASS INDEX: 40.77 KG/M2 | WEIGHT: 269 LBS | OXYGEN SATURATION: 99 % | RESPIRATION RATE: 18 BRPM | HEIGHT: 68 IN | HEART RATE: 69 BPM | SYSTOLIC BLOOD PRESSURE: 138 MMHG | DIASTOLIC BLOOD PRESSURE: 80 MMHG

## 2023-12-01 DIAGNOSIS — J01.90 ACUTE VIRAL SINUSITIS: Primary | ICD-10-CM

## 2023-12-01 DIAGNOSIS — B97.89 ACUTE VIRAL SINUSITIS: Primary | ICD-10-CM

## 2023-12-01 DIAGNOSIS — R05.1 ACUTE COUGH: ICD-10-CM

## 2023-12-01 PROCEDURE — 71046 X-RAY EXAM CHEST 2 VIEWS: CPT

## 2023-12-01 PROCEDURE — 99213 OFFICE O/P EST LOW 20 MIN: CPT | Performed by: STUDENT IN AN ORGANIZED HEALTH CARE EDUCATION/TRAINING PROGRAM

## 2023-12-01 RX ORDER — FLUTICASONE PROPIONATE 50 MCG
1 SPRAY, SUSPENSION (ML) NASAL 2 TIMES DAILY
Qty: 11.1 ML | Refills: 0 | Status: SHIPPED | OUTPATIENT
Start: 2023-12-01

## 2023-12-01 RX ORDER — CETIRIZINE HYDROCHLORIDE 10 MG/1
10 TABLET ORAL DAILY
Qty: 10 TABLET | Refills: 0 | Status: SHIPPED | OUTPATIENT
Start: 2023-12-01 | End: 2023-12-11

## 2023-12-01 RX ORDER — ONDANSETRON 4 MG/1
1 TABLET, ORALLY DISINTEGRATING ORAL
COMMUNITY
Start: 2014-11-03

## 2023-12-01 RX ORDER — BENZONATATE 100 MG/1
100 CAPSULE ORAL 3 TIMES DAILY PRN
Qty: 20 CAPSULE | Refills: 0 | Status: SHIPPED | OUTPATIENT
Start: 2023-12-01

## 2023-12-01 NOTE — PROGRESS NOTES
[facility]        NAME: Andres Dudley is a 48 y.o. female  : 1970    MRN: [id number]  DATE: 2023  TIME: 2:10 PM    Assessment and Orders   Acute viral sinusitis [J01.90, B97.89]  1. Acute viral sinusitis  benzonatate (TESSALON PERLES) 100 mg capsule    cetirizine (ZyrTEC) 10 mg tablet    fluticasone (FLONASE) 50 mcg/act nasal spray      2. Acute cough  XR chest pa & lateral            Plan and Discussion      Symptoms and exam consistent with acute viral illness. Will treat with Zyrtec, Flonase and tessalon Perles. CXR is normal. Discussed supportive care. Discussed ED precautions including (but not limited to)  Difficultly breathing or shortness of breath  Chest pain  Acutely worsening symptoms. Risks and benefits discussed. Patient understands and agrees with the plan. Follow up with PCP. Chief Complaint     Chief Complaint   Patient presents with    Cough     Pt presents nasal congestion, post nasal drip, cough, x 6 days. Pt denies any fevers, but states symptoms are getting worse each day. She has been using Dayquil and Nyquil for symptom relief. Pt tested negative for covid at home x 2 days ago. History of Present Illness       Cough  This is a new problem. The current episode started in the past 7 days. The cough is Non-productive. Associated symptoms include nasal congestion and rhinorrhea. Pertinent negatives include no ear pain, fever, headaches, sore throat, shortness of breath or wheezing. There is no history of asthma or COPD. Sinusitis  This is a new problem. The current episode started in the past 7 days. There has been no fever. Associated symptoms include congestion and coughing. Pertinent negatives include no ear pain, headaches, shortness of breath or sore throat. Past treatments include nasal decongestants. The treatment provided mild relief. Review of Systems   Review of Systems   Constitutional:  Negative for fever.    HENT: Positive for congestion and rhinorrhea. Negative for ear pain and sore throat. Respiratory:  Positive for cough. Negative for shortness of breath and wheezing. Neurological:  Negative for headaches. Current Medications       Current Outpatient Medications:     benzonatate (TESSALON PERLES) 100 mg capsule, Take 1 capsule (100 mg total) by mouth 3 (three) times a day as needed for cough, Disp: 20 capsule, Rfl: 0    cetirizine (ZyrTEC) 10 mg tablet, Take 1 tablet (10 mg total) by mouth daily for 10 days, Disp: 10 tablet, Rfl: 0    fluticasone (FLONASE) 50 mcg/act nasal spray, 1 spray into each nostril 2 (two) times a day, Disp: 11.1 mL, Rfl: 0    Melatonin 5 MG TABS, Take by mouth daily at bedtime, Disp: , Rfl:     metroNIDAZOLE (METROCREAM) 0.75 % cream, Apply 1 application. topically 2 (two) times a day, Disp: , Rfl:     ondansetron (ZOFRAN-ODT) 4 mg disintegrating tablet, Take 1 tablet by mouth, Disp: , Rfl:     Ibuprofen (ADVIL PO), Take by mouth as needed (Patient not taking: Reported on 12/1/2023), Disp: , Rfl:     sodium picosulfate, magnesium oxide, citric acid (Clenpiq) 10-3.5-12 MG-GM -GM/160ML SOLN, Take 1 kit by mouth see administration instructions Follow instructions given at office visit (Patient not taking: Reported on 12/1/2023), Disp: 160 mL, Rfl: 0    Current Allergies     Allergies as of 12/01/2023    (No Known Allergies)            The following portions of the patient's history were reviewed and updated as appropriate: allergies, current medications, past family history, past medical history, past social history, past surgical history and problem list.     History reviewed. No pertinent past medical history. Past Surgical History:   Procedure Laterality Date    HYSTEROSCOPY  10/2019    KNEE ARTHROSCOPY Left        Family History   Problem Relation Age of Onset    Arthritis Mother     Hypertension Father     Diabetes Father          Medications have been verified.         Objective /80   Pulse 69   Temp (!) 97.3 °F (36.3 °C)   Resp 18   Ht 5' 8" (1.727 m)   Wt 122 kg (269 lb)   SpO2 99%   BMI 40.90 kg/m²   No LMP recorded. Physical Exam     Physical Exam  Constitutional:       General: She is not in acute distress. Appearance: She is not ill-appearing or toxic-appearing. HENT:      Head: Normocephalic and atraumatic. Right Ear: Tympanic membrane and external ear normal.      Left Ear: There is impacted cerumen. Nose: Congestion present. Comments: Boggy nasal turbinates     Mouth/Throat:      Pharynx: Posterior oropharyngeal erythema present. Comments: Cobblestone appearance in posterior pharynx  Cardiovascular:      Rate and Rhythm: Normal rate and regular rhythm. Pulmonary:      Effort: Pulmonary effort is normal. No respiratory distress. Breath sounds: No wheezing. Neurological:      General: No focal deficit present. Mental Status: She is alert and oriented to person, place, and time. Psychiatric:         Mood and Affect: Mood normal.         Behavior: Behavior normal.         Thought Content:  Thought content normal.         Judgment: Judgment normal.               Patrick Santos DO

## 2024-01-22 ENCOUNTER — EVALUATION (OUTPATIENT)
Dept: PHYSICAL THERAPY | Facility: MEDICAL CENTER | Age: 54
End: 2024-01-22
Payer: COMMERCIAL

## 2024-01-22 DIAGNOSIS — Z96.661 HX OF TOTAL ANKLE REPLACEMENT, RIGHT: Primary | ICD-10-CM

## 2024-01-22 PROCEDURE — 97161 PT EVAL LOW COMPLEX 20 MIN: CPT | Performed by: PHYSICAL THERAPIST

## 2024-01-22 NOTE — LETTER
2024    John Perea MD  [address] [place] [address]    Patient: Lana Salazar   YOB: 1970   Date of Visit: 2024     Encounter Diagnosis     ICD-10-CM    1. Hx of total ankle replacement, right  Z96.661           Dear Dr. Perea:    Thank you for your recent referral of Lana Salazar. Please review the attached evaluation summary from Lana's recent visit.     Please verify that you agree with the plan of care by signing the attached order.     If you have any questions or concerns, please do not hesitate to call.     I sincerely appreciate the opportunity to share in the care of one of your patients and hope to have another opportunity to work with you in the near future.       Sincerely,    Monroe Grace, PT      Referring Provider:      I certify that I have read the below Plan of Care and certify the need for these services furnished under this plan of treatment while under my care.                    John Perea MD  [address] [place] [address]  Via Fax: [phone]          PT Evaluation     Today's date: 2024  Patient name: Lana Salazar  : 1970  MRN: [id number]  Referring provider: John Perea MD  Dx:   Encounter Diagnosis     ICD-10-CM    1. Hx of total ankle replacement, right  Z96.661                      Assessment  Assessment details: Lana Salazar is a 53 y.o. female was evaluated on 2024  for Hx of total ankle replacement, right  (primary encounter diagnosis). Lana Salazar has the above listed impairments resulting in functional deficits and negative impact to quality of life.  Patient is appropriate for skilled PT intervention to promote maximal return to function and patient specific goals.      Patient agrees with outlined treatment plan and all questions were answered to their satisfaction.       Impairments: abnormal muscle firing, abnormal muscle tone, abnormal or restricted ROM, impaired  physical strength, lacks appropriate home exercise program and pain with function  Understanding of Dx/Px/POC: good   Prognosis: good    Goals  Patient will successfully transition to home exercise program.  Patient will be able to manage symptoms independently.    Lana will return to her walking routine without limitation  Lana will report no limitation in bike riding      Plan  Patient would benefit from: skilled PT  Referral necessary: No  Planned modality interventions: thermotherapy: hydrocollator packs  Planned therapy interventions: home exercise program, manual therapy, neuromuscular re-education, patient education, functional ROM exercises, strengthening, stretching, joint mobilization, graded activity, graded exercise, therapeutic exercise, body mechanics training, motor coordination training and activity modification  Frequency: 2x week  Duration in weeks: 12  Treatment plan discussed with: patient        Subjective Evaluation    History of Present Illness  Mechanism of injury: Lana Salazar is a 53 y.o. female presenting to therapy s/p R total ankle arthroplasty on .    She had uncomplicated hospital stay, discharged home.  She was NWB for 3 weeks then transitioned to CAM boot and at recent MD follow up she was cleared to begin to wean from CAM and begin brace.   She is hoping to to get back to active lifestyle of walking and bike riding.     Patient Goals  Patient goals for therapy: decreased pain, increased motion, return to sport/leisure activities, independence with ADLs/IADLs and increased strength    Pain  Current pain rating: 3  At best pain ratin  At worst pain ratin  Quality: dull ache, discomfort and tight          Objective     Observations     Right Ankle/Foot   Positive for incision. Negative for drainage, edema and effusion.     Neurological Testing     Sensation     Ankle/Foot   Left Ankle/Foot   Intact: light touch    Right Ankle/Foot   Intact: light touch     Active  Range of Motion     Right Ankle/Foot   Dorsiflexion (ke): 0 degrees   Plantar flexion: 15 degrees   Inversion: 5 degrees   Eversion: 5 degrees     Joint Play     Right Ankle/Foot  Hypomobile in the midfoot.     Strength/Myotome Testing     Left Ankle/Foot   Dorsiflexion: 5  Plantar flexion: 5  Inversion: 5  Eversion: 5    Right Ankle/Foot   Dorsiflexion: 3+  Plantar flexion: 3+  Inversion: 3+  Eversion: 3+             Precautions: WBAT in brace       Manuals 1/22                                                                Neuro Re-Ed                                                                                                        Ther Ex                                                                                                                     Ther Activity                                       Gait Training                                       Modalities

## 2024-01-23 NOTE — PROGRESS NOTES
PT Evaluation     Today's date: 2024  Patient name: Lana Salazar  : 1970  MRN: [id number]  Referring provider: John Perea MD  Dx:   Encounter Diagnosis     ICD-10-CM    1. Hx of total ankle replacement, right  Z96.661                      Assessment  Assessment details: Lana Salazar is a 53 y.o. female was evaluated on 2024  for Hx of total ankle replacement, right  (primary encounter diagnosis). Lana Salazar has the above listed impairments resulting in functional deficits and negative impact to quality of life.  Patient is appropriate for skilled PT intervention to promote maximal return to function and patient specific goals.      Patient agrees with outlined treatment plan and all questions were answered to their satisfaction.       Impairments: abnormal muscle firing, abnormal muscle tone, abnormal or restricted ROM, impaired physical strength, lacks appropriate home exercise program and pain with function  Understanding of Dx/Px/POC: good   Prognosis: good    Goals  Patient will successfully transition to home exercise program.  Patient will be able to manage symptoms independently.    Lana will return to her walking routine without limitation  Lana will report no limitation in bike riding      Plan  Patient would benefit from: skilled PT  Referral necessary: No  Planned modality interventions: thermotherapy: hydrocollator packs  Planned therapy interventions: home exercise program, manual therapy, neuromuscular re-education, patient education, functional ROM exercises, strengthening, stretching, joint mobilization, graded activity, graded exercise, therapeutic exercise, body mechanics training, motor coordination training and activity modification  Frequency: 2x week  Duration in weeks: 12  Treatment plan discussed with: patient        Subjective Evaluation    History of Present Illness  Mechanism of injury: Lana Salazar is a 53 y.o. female presenting to therapy s/p  R total ankle arthroplasty on .    She had uncomplicated hospital stay, discharged home.  She was NWB for 3 weeks then transitioned to CAM boot and at recent MD follow up she was cleared to begin to wean from CAM and begin brace.   She is hoping to to get back to active lifestyle of walking and bike riding.     Patient Goals  Patient goals for therapy: decreased pain, increased motion, return to sport/leisure activities, independence with ADLs/IADLs and increased strength    Pain  Current pain rating: 3  At best pain ratin  At worst pain ratin  Quality: dull ache, discomfort and tight          Objective     Observations     Right Ankle/Foot   Positive for incision. Negative for drainage, edema and effusion.     Neurological Testing     Sensation     Ankle/Foot   Left Ankle/Foot   Intact: light touch    Right Ankle/Foot   Intact: light touch     Active Range of Motion     Right Ankle/Foot   Dorsiflexion (ke): 0 degrees   Plantar flexion: 15 degrees   Inversion: 5 degrees   Eversion: 5 degrees     Joint Play     Right Ankle/Foot  Hypomobile in the midfoot.     Strength/Myotome Testing     Left Ankle/Foot   Dorsiflexion: 5  Plantar flexion: 5  Inversion: 5  Eversion: 5    Right Ankle/Foot   Dorsiflexion: 3+  Plantar flexion: 3+  Inversion: 3+  Eversion: 3+             Precautions: WBAT in brace       Manuals                                                                 Neuro Re-Ed                                                                                                        Ther Ex                                                                                                                     Ther Activity                                       Gait Training                                       Modalities

## 2024-01-24 ENCOUNTER — OFFICE VISIT (OUTPATIENT)
Dept: PHYSICAL THERAPY | Facility: MEDICAL CENTER | Age: 54
End: 2024-01-24
Payer: COMMERCIAL

## 2024-01-24 DIAGNOSIS — Z96.661 HX OF TOTAL ANKLE REPLACEMENT, RIGHT: Primary | ICD-10-CM

## 2024-01-24 PROCEDURE — 97110 THERAPEUTIC EXERCISES: CPT | Performed by: PHYSICAL THERAPIST

## 2024-01-24 PROCEDURE — 97140 MANUAL THERAPY 1/> REGIONS: CPT | Performed by: PHYSICAL THERAPIST

## 2024-01-26 NOTE — PROGRESS NOTES
Daily Note     Today's date: 2024  Patient name: Lana Salazar  : 1970  MRN: [id number]  Referring provider: John Perea MD  Dx:   Encounter Diagnosis     ICD-10-CM    1. Hx of total ankle replacement, right  Z96.661                      Subjective: Lana reports doing well, she has been using brace more instead of boot and feeling pretty good so far       Objective: See treatment diary below      Assessment: Tolerated treatment well. Patient exhibited good technique with therapeutic exercises and would benefit from continued PT      Plan: Continue per plan of care.      Precautions: WBAT in brace       Manuals             PROM AF                                                   Neuro Re-Ed                                                                                                        Ther Ex             Bike 10 min             Gastroc stretch with strap 30 sec  X 3             Ankle circles CW/CCW  30            TB PF/IN/EV 30 black              Standing rocker board PF/DF 30            Cone heel toe step overs 30            SLS with assist 10  sec  X 10                         Ther Activity                                       Gait Training                                       Modalities

## 2024-01-29 ENCOUNTER — OFFICE VISIT (OUTPATIENT)
Dept: PHYSICAL THERAPY | Facility: MEDICAL CENTER | Age: 54
End: 2024-01-29
Payer: COMMERCIAL

## 2024-01-29 DIAGNOSIS — Z96.661 HX OF TOTAL ANKLE REPLACEMENT, RIGHT: Primary | ICD-10-CM

## 2024-01-29 PROCEDURE — 97110 THERAPEUTIC EXERCISES: CPT | Performed by: PHYSICAL THERAPIST

## 2024-01-30 NOTE — PROGRESS NOTES
"Daily Note     Today's date: 2024  Patient name: Lana Salazar  : 1970  MRN: [id number]  Referring provider: John Perea MD  Dx:   Encounter Diagnosis     ICD-10-CM    1. Hx of total ankle replacement, right  Z96.661                      Subjective: Lana reports doing well, she has been using brace more instead of boot and feeling pretty good so far       Objective: See treatment diary below      Assessment: Tolerated treatment well. Patient exhibited good technique with therapeutic exercises and would benefit from continued PT      Plan: Continue per plan of care.      Precautions: WBAT in brace       Manuals             PROM AF                                                   Neuro Re-Ed                                                                                                        Ther Ex             Bike 10 min             Gastroc stretch with strap 30 sec  X 3             Ankle circles CW/CCW  30            TB PF/IN/EV 30 black              Standing rocker board PF/DF 30            Cone heel toe step overs 30            SLS with assist 10  sec  X 10            Step downs 4\"  30            Ther Activity                                       Gait Training                                       Modalities                                            "

## 2024-01-31 ENCOUNTER — OFFICE VISIT (OUTPATIENT)
Dept: PHYSICAL THERAPY | Facility: MEDICAL CENTER | Age: 54
End: 2024-01-31
Payer: COMMERCIAL

## 2024-01-31 DIAGNOSIS — Z96.661 HX OF TOTAL ANKLE REPLACEMENT, RIGHT: Primary | ICD-10-CM

## 2024-01-31 PROCEDURE — 97140 MANUAL THERAPY 1/> REGIONS: CPT | Performed by: PHYSICAL THERAPIST

## 2024-01-31 PROCEDURE — 97110 THERAPEUTIC EXERCISES: CPT | Performed by: PHYSICAL THERAPIST

## 2024-02-02 NOTE — PROGRESS NOTES
"Daily Note     Today's date: 2024  Patient name: Lana Salazar  : 1970  MRN: [id number]  Referring provider: John Perea MD  Dx:   Encounter Diagnosis     ICD-10-CM    1. Hx of total ankle replacement, right  Z96.661                      Subjective: Lana reports doing well,       Objective: See treatment diary below      Assessment: Tolerated treatment well. Patient exhibited good technique with therapeutic exercises and would benefit from continued PT  Continue to work on dorsiflexion mobility and walking mechanics       Plan: Continue per plan of care.      Precautions: WBAT in brace       Manuals             PROM AF                                                   Neuro Re-Ed                                                                                                        Ther Ex             Bike 10 min             Gastroc stretch with strap 30 sec  X 3             Ankle circles CW/CCW  30            TB PF/IN/EV 30 black              Standing rocker board PF/DF 30            Cone heel toe step overs 30            SLS with assist 10  sec  X 10            Step downs 4\"  30            DF mob with band on step 30            Ther Activity                                       Gait Training                                       Modalities                                            "

## 2024-02-05 ENCOUNTER — OFFICE VISIT (OUTPATIENT)
Dept: PHYSICAL THERAPY | Facility: MEDICAL CENTER | Age: 54
End: 2024-02-05
Payer: COMMERCIAL

## 2024-02-05 DIAGNOSIS — Z96.661 HX OF TOTAL ANKLE REPLACEMENT, RIGHT: Primary | ICD-10-CM

## 2024-02-05 PROCEDURE — 97110 THERAPEUTIC EXERCISES: CPT | Performed by: PHYSICAL THERAPIST

## 2024-02-05 PROCEDURE — 97140 MANUAL THERAPY 1/> REGIONS: CPT | Performed by: PHYSICAL THERAPIST

## 2024-02-06 NOTE — PROGRESS NOTES
"Daily Note     Today's date: 2024  Patient name: Lana Salazar  : 1970  MRN: [id number]  Referring provider: John Perea MD  Dx:   Encounter Diagnosis     ICD-10-CM    1. Hx of total ankle replacement, right  Z96.661                      Subjective: Lana reports doing well,       Objective: See treatment diary below      Assessment: Tolerated treatment well. Patient exhibited good technique with therapeutic exercises and would benefit from continued PT  Continue to work on dorsiflexion mobility and walking mechanics       Plan: Continue per plan of care.      Precautions: WBAT in brace       Manuals             PROM AF                                                   Neuro Re-Ed                                                                                                        Ther Ex             Bike 10 min             Gastroc stretch with strap 30 sec  X 3             Ankle circles CW/CCW  30            TB PF/IN/EV 30 black              Standing rocker board PF/DF 30            Cone heel toe step overs 30            SLS with assist 10  sec  X 10            Step downs 4\"  30            DF mob with band on step 30            Ther Activity                                       Gait Training                                       Modalities                                            "

## 2024-02-07 ENCOUNTER — OFFICE VISIT (OUTPATIENT)
Dept: PHYSICAL THERAPY | Facility: MEDICAL CENTER | Age: 54
End: 2024-02-07
Payer: COMMERCIAL

## 2024-02-07 DIAGNOSIS — Z96.661 HX OF TOTAL ANKLE REPLACEMENT, RIGHT: Primary | ICD-10-CM

## 2024-02-07 PROCEDURE — 97140 MANUAL THERAPY 1/> REGIONS: CPT | Performed by: PHYSICAL THERAPIST

## 2024-02-07 PROCEDURE — 97110 THERAPEUTIC EXERCISES: CPT | Performed by: PHYSICAL THERAPIST

## 2024-02-09 NOTE — PROGRESS NOTES
"Daily Note     Today's date: 2024  Patient name: Lana Salazar  : 1970  MRN: [id number]  Referring provider: John Perea MD  Dx:   Encounter Diagnosis     ICD-10-CM    1. Hx of total ankle replacement, right  Z96.661                      Subjective: Lana reports doing well,       Objective: See treatment diary below      Assessment: Tolerated treatment well. Patient exhibited good technique with therapeutic exercises and would benefit from continued PT  Continue to work on dorsiflexion mobility and walking mechanics       Plan: Continue per plan of care.      Precautions: WBAT in brace       Manuals             PROM AF                                                   Neuro Re-Ed                                                                                                        Ther Ex             Bike 10 min             Gastroc stretch with strap 30 sec  X 3             Ankle circles CW/CCW  30            TB PF/IN/EV 30 black              Standing rocker board PF/DF 30            Cone heel toe step overs 30            SLS with assist 10  sec  X 10            Step downs 4\"  30            Side steps and back pedal with shannan 30            DF mob with band on step 30            Ther Activity                                       Gait Training                                       Modalities                                            "

## 2024-02-12 ENCOUNTER — OFFICE VISIT (OUTPATIENT)
Dept: PHYSICAL THERAPY | Facility: MEDICAL CENTER | Age: 54
End: 2024-02-12
Payer: COMMERCIAL

## 2024-02-12 DIAGNOSIS — Z96.661 HX OF TOTAL ANKLE REPLACEMENT, RIGHT: Primary | ICD-10-CM

## 2024-02-12 PROCEDURE — 97140 MANUAL THERAPY 1/> REGIONS: CPT | Performed by: PHYSICAL THERAPIST

## 2024-02-12 PROCEDURE — 97110 THERAPEUTIC EXERCISES: CPT | Performed by: PHYSICAL THERAPIST

## 2024-02-12 NOTE — PROGRESS NOTES
"Daily Note     Today's date: 2024  Patient name: Lana Salazar  : 1970  MRN: [id number]  Referring provider: John Perea MD  Dx:   Encounter Diagnosis     ICD-10-CM    1. Hx of total ankle replacement, right  Z96.661                      Subjective: Lana reports doing well,       Objective: See treatment diary below      Assessment: Tolerated treatment well. Patient exhibited good technique with therapeutic exercises and would benefit from continued PT  Continue to work on dorsiflexion mobility and walking mechanics       Plan: Continue per plan of care.      Precautions: WBAT in brace       Manuals             PROM AF                                                   Neuro Re-Ed                                                                                                        Ther Ex             Bike 10 min             Gastroc stretch with strap 30 sec  X 3             Ankle circles CW/CCW  30            TB PF/IN/EV 30 black              Standing rocker board PF/DF 30            Cone heel toe step overs 30            SLS with assist 10  sec  X 10            Step downs 4\"  30            Side steps and back pedal with shannan 30            Walking sled push 50#  3 laps             DF mob with band on step 30            Ther Activity                                       Gait Training                                       Modalities                                            "

## 2024-02-14 ENCOUNTER — OFFICE VISIT (OUTPATIENT)
Dept: PHYSICAL THERAPY | Facility: MEDICAL CENTER | Age: 54
End: 2024-02-14
Payer: COMMERCIAL

## 2024-02-14 DIAGNOSIS — Z96.661 HX OF TOTAL ANKLE REPLACEMENT, RIGHT: Primary | ICD-10-CM

## 2024-02-14 PROCEDURE — 97110 THERAPEUTIC EXERCISES: CPT | Performed by: PHYSICAL THERAPIST

## 2024-02-14 PROCEDURE — 97140 MANUAL THERAPY 1/> REGIONS: CPT | Performed by: PHYSICAL THERAPIST

## 2024-02-14 NOTE — PROGRESS NOTES
"Daily Note     Today's date: 2024  Patient name: Lana Salazar  : 1970  MRN: [id number]  Referring provider: John Perea MD  Dx:   Encounter Diagnosis     ICD-10-CM    1. Hx of total ankle replacement, right  Z96.661                      Subjective: Lana reports doing well,       Objective: See treatment diary below      Assessment: Tolerated treatment well. Patient exhibited good technique with therapeutic exercises and would benefit from continued PT  Continue to work on dorsiflexion mobility and walking mechanics       Plan: Continue per plan of care.      Precautions: WBAT in brace       Manuals             PROM AF                                                   Neuro Re-Ed                                                                                                        Ther Ex             Bike 10 min             Gastroc stretch with strap 30 sec  X 3             Ankle circles CW/CCW  30            TB PF/IN/EV 30 black              Standing rocker board PF/DF 30            Cone heel toe step overs 30            SLS with assist 10  sec  X 10            Step downs 4\"  30            Side steps and back pedal with shannan 30            Walking sled push 50#  3 laps             DF mob with band on step 30            Ther Activity                                       Gait Training                                       Modalities                                            "

## 2024-02-19 ENCOUNTER — OFFICE VISIT (OUTPATIENT)
Dept: PHYSICAL THERAPY | Facility: MEDICAL CENTER | Age: 54
End: 2024-02-19
Payer: COMMERCIAL

## 2024-02-19 DIAGNOSIS — Z96.661 HX OF TOTAL ANKLE REPLACEMENT, RIGHT: Primary | ICD-10-CM

## 2024-02-19 PROCEDURE — 97110 THERAPEUTIC EXERCISES: CPT | Performed by: PHYSICAL THERAPIST

## 2024-02-19 PROCEDURE — 97140 MANUAL THERAPY 1/> REGIONS: CPT | Performed by: PHYSICAL THERAPIST

## 2024-02-19 NOTE — PROGRESS NOTES
"Daily Note     Today's date: 2024  Patient name: Lana Salazar  : 1970  MRN: [id number]  Referring provider: John Perea MD  Dx:   Encounter Diagnosis     ICD-10-CM    1. Hx of total ankle replacement, right  Z96.661                      Subjective: Lana reports doing well,       Objective: See treatment diary below      Assessment: Tolerated treatment well. Patient exhibited good technique with therapeutic exercises and would benefit from continued PT  Continue to work on dorsiflexion mobility and walking mechanics , strength and mobility continue to improve       Plan: Continue per plan of care.      Precautions: WBAT in brace       Manuals             PROM AF                                                   Neuro Re-Ed                                                                                                        Ther Ex             Bike 10 min             Gastroc stretch with strap 30 sec  X 3             Ankle circles CW/CCW  30            TB PF/IN/EV 30 black              Standing rocker board PF/DF 30            Cone heel toe step overs 30            SLS with assist 10  sec  X 10            Step downs 4\"  30            Side steps and back pedal with shannan 30            Walking sled push 50#  3 laps             DF mob with band on step 30            Ther Activity                                       Gait Training                                       Modalities                                            "

## 2024-02-21 ENCOUNTER — OFFICE VISIT (OUTPATIENT)
Dept: PHYSICAL THERAPY | Facility: MEDICAL CENTER | Age: 54
End: 2024-02-21
Payer: COMMERCIAL

## 2024-02-21 DIAGNOSIS — Z96.661 HX OF TOTAL ANKLE REPLACEMENT, RIGHT: Primary | ICD-10-CM

## 2024-02-21 DIAGNOSIS — Z00.6 ENCOUNTER FOR EXAMINATION FOR NORMAL COMPARISON OR CONTROL IN CLINICAL RESEARCH PROGRAM: ICD-10-CM

## 2024-02-21 PROCEDURE — 97110 THERAPEUTIC EXERCISES: CPT | Performed by: PHYSICAL THERAPIST

## 2024-02-21 PROCEDURE — 97140 MANUAL THERAPY 1/> REGIONS: CPT | Performed by: PHYSICAL THERAPIST

## 2024-02-23 NOTE — PROGRESS NOTES
"Daily Note     Today's date: 2024  Patient name: Lana Salazar  : 1970  MRN: [id number]  Referring provider: John Perea MD  Dx:   Encounter Diagnosis     ICD-10-CM    1. Hx of total ankle replacement, right  Z96.661                      Subjective: Lana reports doing well,       Objective: See treatment diary below      Assessment: Tolerated treatment well. Patient exhibited good technique with therapeutic exercises and would benefit from continued PT  Continue to work on dorsiflexion mobility and walking mechanics , strength and mobility continue to improve       Plan: Continue per plan of care.      Precautions: WBAT in brace       Manuals             PROM AF                                                   Neuro Re-Ed                                                                                                        Ther Ex             Bike 10 min             Gastroc stretch with strap 30 sec  X 3             Ankle circles CW/CCW  30            TB PF/IN/EV 30 black              Standing rocker board PF/DF 30            Cone heel toe step overs 30            SLS with assist 10  sec  X 10            Step downs 4\"  30            Side steps and back pedal with shannan 30            Walking sled push 50#  3 laps             DF mob with band on step 30            Ther Activity                                       Gait Training                                       Modalities                                            "

## 2024-02-26 ENCOUNTER — OFFICE VISIT (OUTPATIENT)
Dept: PHYSICAL THERAPY | Facility: MEDICAL CENTER | Age: 54
End: 2024-02-26
Payer: COMMERCIAL

## 2024-02-26 DIAGNOSIS — Z96.661 HX OF TOTAL ANKLE REPLACEMENT, RIGHT: Primary | ICD-10-CM

## 2024-02-26 PROCEDURE — 97110 THERAPEUTIC EXERCISES: CPT | Performed by: PHYSICAL THERAPIST

## 2024-02-26 PROCEDURE — 97140 MANUAL THERAPY 1/> REGIONS: CPT | Performed by: PHYSICAL THERAPIST

## 2024-02-27 NOTE — PROGRESS NOTES
"Daily Note     Today's date: 2024  Patient name: Lana Salazar  : 1970  MRN: [id number]  Referring provider: John Perea MD  Dx:   Encounter Diagnosis     ICD-10-CM    1. Hx of total ankle replacement, right  Z96.661                      Subjective: Lana reports doing well, she did have one occurrence of anterior ankle pain when walking back to back days but the pain has resolved for most part       Objective: See treatment diary below      Assessment: Tolerated treatment well. Patient exhibited good technique with therapeutic exercises and would benefit from continued PT  Continue to work on dorsiflexion mobility and walking mechanics , strength and mobility continue to improve       Plan: Continue per plan of care.      Precautions: WBAT in brace       Manuals             PROM AF                                                   Neuro Re-Ed                                                                                                        Ther Ex             Bike 10 min             Gastroc stretch with strap 30 sec  X 3             Ankle circles CW/CCW  30            TB PF/IN/EV 30 black              Standing rocker board PF/DF 30            Cone heel toe step overs 30            SLS with assist 10  sec  X 10            Step downs 4\"  30            Side steps and back pedal with shannan 30            Walking sled push 50#  3 laps             DF mob with band on step 30            Ther Activity                                       Gait Training                                       Modalities                                            "

## 2024-02-28 ENCOUNTER — OFFICE VISIT (OUTPATIENT)
Dept: PHYSICAL THERAPY | Facility: MEDICAL CENTER | Age: 54
End: 2024-02-28
Payer: COMMERCIAL

## 2024-02-28 DIAGNOSIS — Z96.661 HX OF TOTAL ANKLE REPLACEMENT, RIGHT: Primary | ICD-10-CM

## 2024-02-28 PROCEDURE — 97140 MANUAL THERAPY 1/> REGIONS: CPT | Performed by: PHYSICAL THERAPIST

## 2024-02-28 PROCEDURE — 97110 THERAPEUTIC EXERCISES: CPT | Performed by: PHYSICAL THERAPIST

## 2024-02-28 NOTE — PROGRESS NOTES
"Daily Note     Today's date: 2024  Patient name: Lana Salazar  : 1970  MRN: [id number]  Referring provider: John Perea MD  Dx:   Encounter Diagnosis     ICD-10-CM    1. Hx of total ankle replacement, right  Z96.661                      Subjective: Lana reports doing well,       Objective: See treatment diary below      Assessment: Tolerated treatment well. Patient exhibited good technique with therapeutic exercises and would benefit from continued PT  Continue to work on dorsiflexion mobility and walking mechanics , strength and mobility continue to improve       Plan: Continue per plan of care.      Precautions: WBAT in brace       Manuals             PROM AF                                                   Neuro Re-Ed                                                                                                        Ther Ex             Bike 10 min             Gastroc stretch with strap 30 sec  X 3             Ankle circles CW/CCW  30            TB PF/IN/EV 30 black              Standing rocker board PF/DF 30            Cone heel toe step overs 30            SLS with assist 10  sec  X 10            Step downs 4\"  30            Side steps and back pedal with shannan 30            Walking sled push 50#  3 laps             DF mob with band on step 30            Ther Activity                                       Gait Training                                       Modalities                                            "

## 2024-03-04 ENCOUNTER — OFFICE VISIT (OUTPATIENT)
Dept: PHYSICAL THERAPY | Facility: MEDICAL CENTER | Age: 54
End: 2024-03-04
Payer: COMMERCIAL

## 2024-03-04 DIAGNOSIS — Z96.661 HX OF TOTAL ANKLE REPLACEMENT, RIGHT: Primary | ICD-10-CM

## 2024-03-04 PROCEDURE — 97140 MANUAL THERAPY 1/> REGIONS: CPT | Performed by: PHYSICAL THERAPIST

## 2024-03-04 PROCEDURE — 97110 THERAPEUTIC EXERCISES: CPT | Performed by: PHYSICAL THERAPIST

## 2024-03-06 ENCOUNTER — OFFICE VISIT (OUTPATIENT)
Dept: PHYSICAL THERAPY | Facility: MEDICAL CENTER | Age: 54
End: 2024-03-06
Payer: COMMERCIAL

## 2024-03-06 DIAGNOSIS — Z96.661 HX OF TOTAL ANKLE REPLACEMENT, RIGHT: Primary | ICD-10-CM

## 2024-03-06 PROCEDURE — 97140 MANUAL THERAPY 1/> REGIONS: CPT | Performed by: PHYSICAL THERAPIST

## 2024-03-06 PROCEDURE — 97110 THERAPEUTIC EXERCISES: CPT | Performed by: PHYSICAL THERAPIST

## 2024-03-06 NOTE — PROGRESS NOTES
"Daily Note     Today's date: 3/6/2024  Patient name: Lana Salazar  : 1970  MRN: [id number]  Referring provider: John Perea MD  Dx:   Encounter Diagnosis     ICD-10-CM    1. Hx of total ankle replacement, right  Z96.661                      Subjective: Lana reports doing well, she did walk 2.5 miles over weekend and felt sore and bruised feeling       Objective: See treatment diary below      Assessment: Tolerated treatment well. Patient exhibited good technique with therapeutic exercises and would benefit from continued PT  Continue to work on dorsiflexion mobility and walking mechanics , strength and mobility continue to improve       Plan: Continue per plan of care.      Precautions: WBAT in brace       Manuals 3/            PROM AF                                                   Neuro Re-Ed                                                                                                        Ther Ex             Bike 10 min             Gastroc stretch with strap 30 sec  X 3             Ankle circles CW/CCW  30            TB PF/IN/EV 30 black              Standing rocker board PF/DF 30            Cone heel toe step overs 30            SLS with assist 10  sec  X 10            Step downs 4\"  30            Side steps and back pedal with shannan 30            Walking sled push 50#  3 laps             DF mob with band on step 30            Ther Activity                                       Gait Training                                       Modalities                                            "

## 2024-03-08 NOTE — PROGRESS NOTES
"Daily Note     Today's date: 3/8/2024  Patient name: Lana Salazar  : 1970  MRN: [id number]  Referring provider: John Perea MD  Dx:   Encounter Diagnosis     ICD-10-CM    1. Hx of total ankle replacement, right  Z96.661                      Subjective: Lana reports doing well, her soreness after walking the miles did resolve       Objective: See treatment diary below      Assessment: Tolerated treatment well. Patient exhibited good technique with therapeutic exercises and would benefit from continued PT  Continue to work on dorsiflexion mobility and walking mechanics , strength and mobility continue to improve       Plan: Continue per plan of care.      Precautions: WBAT in brace       Manuals 3/6            PROM AF                                                   Neuro Re-Ed                                                                                                        Ther Ex             Bike 10 min             Gastroc stretch with strap 30 sec  X 3             Ankle circles CW/CCW  30            TB PF/IN/EV 30 black              Standing rocker board PF/DF 30            Cone heel toe step overs 30            SLS with assist 10  sec  X 10            Step downs 4\"  30            Side steps and back pedal with shannan 30            Walking sled push 50#  3 laps             DF mob with band on step 30            Ther Activity                                       Gait Training                                       Modalities                                            "

## 2024-03-11 ENCOUNTER — OFFICE VISIT (OUTPATIENT)
Dept: PHYSICAL THERAPY | Facility: MEDICAL CENTER | Age: 54
End: 2024-03-11
Payer: COMMERCIAL

## 2024-03-11 DIAGNOSIS — Z96.661 HX OF TOTAL ANKLE REPLACEMENT, RIGHT: Primary | ICD-10-CM

## 2024-03-11 PROCEDURE — 97110 THERAPEUTIC EXERCISES: CPT | Performed by: PHYSICAL THERAPIST

## 2024-03-11 PROCEDURE — 97140 MANUAL THERAPY 1/> REGIONS: CPT | Performed by: PHYSICAL THERAPIST

## 2024-03-12 NOTE — PROGRESS NOTES
"Daily Note     Today's date: 3/12/2024  Patient name: Lana Salazar  : 1970  MRN: [id number]  Referring provider: John Perea MD  Dx:   Encounter Diagnosis     ICD-10-CM    1. Hx of total ankle replacement, right  Z96.661                      Subjective: Lana reports doing well, saw MD and doing well, hoping for slightly more DF       Objective: See treatment diary below      Assessment: Tolerated treatment well. Patient exhibited good technique with therapeutic exercises and would benefit from continued PT  Continue to work on dorsiflexion mobility and walking mechanics , strength and mobility continue to improve       Plan: Continue per plan of care.      Precautions: WBAT in brace       Manuals 3/12            PROM AF                                                   Neuro Re-Ed                                                                                                        Ther Ex             Bike 10 min             Gastroc stretch with strap 30 sec  X 3             Ankle circles CW/CCW  30            TB PF/IN/EV 30 black              Standing rocker board PF/DF 30            Cone heel toe step overs 30            SLS with assist 10  sec  X 10            Step downs 4\"  30            Side steps and back pedal with shannan 30            Walking sled push 50#  3 laps             DF mob with band on step 30            Ther Activity                                       Gait Training                                       Modalities                                            "

## 2024-03-15 ENCOUNTER — OFFICE VISIT (OUTPATIENT)
Dept: PHYSICAL THERAPY | Facility: MEDICAL CENTER | Age: 54
End: 2024-03-15
Payer: COMMERCIAL

## 2024-03-15 DIAGNOSIS — Z96.661 HX OF TOTAL ANKLE REPLACEMENT, RIGHT: Primary | ICD-10-CM

## 2024-03-15 PROCEDURE — 97110 THERAPEUTIC EXERCISES: CPT | Performed by: PHYSICAL THERAPIST

## 2024-03-15 PROCEDURE — 97140 MANUAL THERAPY 1/> REGIONS: CPT | Performed by: PHYSICAL THERAPIST

## 2024-03-15 NOTE — PROGRESS NOTES
"Daily Note     Today's date: 3/15/2024  Patient name: Lana Salazar  : 1970  MRN: [id number]  Referring provider: John Perea MD  Dx:   Encounter Diagnosis     ICD-10-CM    1. Hx of total ankle replacement, right  Z96.661                      Subjective: Lana reports being more sore today, went back to work and walked somewhat more but had a good deal of soreness.   Gradually is getting better       Objective: See treatment diary below      Assessment: Tolerated treatment well. Patient exhibited good technique with therapeutic exercises and would benefit from continued PT  Continue to work on dorsiflexion mobility and walking mechanics , strength and mobility continue to improve       Plan: Continue per plan of care.      Precautions: WBAT in brace       Manuals 3/15            PROM AF                                                   Neuro Re-Ed                                                                                                        Ther Ex             Bike 10 min             Gastroc stretch with strap 30 sec  X 3             Ankle circles CW/CCW  30            TB PF/IN/EV 30 black              Standing rocker board PF/DF 30            Cone heel toe step overs 30            SLS with assist 10  sec  X 10            Step downs 4\"  30            Side steps and back pedal with shannan 30            Walking sled push 50#  3 laps             DF mob with band on step 30            Ther Activity                                       Gait Training                                       Modalities                                          3  "

## 2024-03-16 ENCOUNTER — APPOINTMENT (OUTPATIENT)
Dept: LAB | Facility: MEDICAL CENTER | Age: 54
End: 2024-03-16

## 2024-03-16 DIAGNOSIS — Z00.6 ENCOUNTER FOR EXAMINATION FOR NORMAL COMPARISON OR CONTROL IN CLINICAL RESEARCH PROGRAM: ICD-10-CM

## 2024-03-16 PROCEDURE — 36415 COLL VENOUS BLD VENIPUNCTURE: CPT

## 2024-03-18 ENCOUNTER — OFFICE VISIT (OUTPATIENT)
Dept: PHYSICAL THERAPY | Facility: MEDICAL CENTER | Age: 54
End: 2024-03-18
Payer: COMMERCIAL

## 2024-03-18 DIAGNOSIS — Z96.661 HX OF TOTAL ANKLE REPLACEMENT, RIGHT: Primary | ICD-10-CM

## 2024-03-18 PROCEDURE — 97110 THERAPEUTIC EXERCISES: CPT | Performed by: PHYSICAL THERAPIST

## 2024-03-18 PROCEDURE — 97140 MANUAL THERAPY 1/> REGIONS: CPT | Performed by: PHYSICAL THERAPIST

## 2024-03-18 NOTE — PROGRESS NOTES
"Daily Note     Today's date: 3/18/2024  Patient name: Lana Salazar  : 1970  MRN: [id number]  Referring provider: John Perea MD  Dx:   Encounter Diagnosis     ICD-10-CM    1. Hx of total ankle replacement, right  Z96.661                      Subjective: Lana reports doing very well today, did a lot of walking yesterday but feeling very good today       Objective: See treatment diary below      Assessment: Tolerated treatment well. Patient exhibited good technique with therapeutic exercises and would benefit from continued PT  Continue to work on dorsiflexion mobility and walking mechanics , strength and mobility continue to improve       Plan: Continue per plan of care.      Precautions: WBAT in brace       Manuals 3/18            PROM AF                                                   Neuro Re-Ed                                                                                                        Ther Ex             Bike 10 min             Gastroc stretch with strap 30 sec  X 3             Ankle circles CW/CCW  30            TB PF/IN/EV 30 black              Standing rocker board PF/DF 30            Cone heel toe step overs 30            SLS with assist 10  sec  X 10            Step downs 4\"  30            Side steps and back pedal with shannan 30            Walking sled push 50#  3 laps             DF mob with band on step 30            Ther Activity                                       Gait Training                                       Modalities                                          3  "

## 2024-03-22 ENCOUNTER — OFFICE VISIT (OUTPATIENT)
Dept: PHYSICAL THERAPY | Facility: MEDICAL CENTER | Age: 54
End: 2024-03-22
Payer: COMMERCIAL

## 2024-03-22 DIAGNOSIS — Z96.661 HX OF TOTAL ANKLE REPLACEMENT, RIGHT: Primary | ICD-10-CM

## 2024-03-22 PROCEDURE — 97140 MANUAL THERAPY 1/> REGIONS: CPT | Performed by: PHYSICAL THERAPIST

## 2024-03-22 PROCEDURE — 97110 THERAPEUTIC EXERCISES: CPT | Performed by: PHYSICAL THERAPIST

## 2024-03-25 ENCOUNTER — OFFICE VISIT (OUTPATIENT)
Dept: PHYSICAL THERAPY | Facility: MEDICAL CENTER | Age: 54
End: 2024-03-25
Payer: COMMERCIAL

## 2024-03-25 DIAGNOSIS — Z96.661 HX OF TOTAL ANKLE REPLACEMENT, RIGHT: Primary | ICD-10-CM

## 2024-03-25 PROCEDURE — 97110 THERAPEUTIC EXERCISES: CPT | Performed by: PHYSICAL THERAPIST

## 2024-03-25 PROCEDURE — 97140 MANUAL THERAPY 1/> REGIONS: CPT | Performed by: PHYSICAL THERAPIST

## 2024-03-26 NOTE — PROGRESS NOTES
"Daily Note     Today's date: 3/26/2024  Patient name: Lana Salazar  : 1970  MRN: [id number]  Referring provider: John Perea MD  Dx:   Encounter Diagnosis     ICD-10-CM    1. Hx of total ankle replacement, right  Z96.661                      Subjective: Lana reports doing well, did well without step downs last visit     Objective: See treatment diary below      Assessment: Tolerated treatment well. Patient exhibited good technique with therapeutic exercises and would benefit from continued PT  Continue to work on dorsiflexion mobility and walking mechanics , strength and mobility continue to improve        Plan: Continue per plan of care.      Precautions: WBAT in brace       Manuals 3/25            PROM AF                                                   Neuro Re-Ed                                                                                                        Ther Ex             Bike 10 min             Gastroc stretch with strap 30 sec  X 3             Ankle circles CW/CCW  30            TB PF/IN/EV 30 black              Standing rocker board PF/DF 30            Cone heel toe step overs 30            SLS with assist 10  sec  X 10            Step downs 4\"  30            Side steps and back pedal with shannan 30            Walking sled push 50#  3 laps             DF mob with band on step 30            Ther Activity                                       Gait Training                                       Modalities                                          3  "

## 2024-03-27 ENCOUNTER — OFFICE VISIT (OUTPATIENT)
Dept: PHYSICAL THERAPY | Facility: MEDICAL CENTER | Age: 54
End: 2024-03-27
Payer: COMMERCIAL

## 2024-03-27 DIAGNOSIS — Z96.661 HX OF TOTAL ANKLE REPLACEMENT, RIGHT: Primary | ICD-10-CM

## 2024-03-27 PROCEDURE — 97140 MANUAL THERAPY 1/> REGIONS: CPT | Performed by: PHYSICAL THERAPIST

## 2024-03-27 PROCEDURE — 97110 THERAPEUTIC EXERCISES: CPT | Performed by: PHYSICAL THERAPIST

## 2024-03-29 NOTE — PROGRESS NOTES
"Daily Note     Today's date: 3/29/2024  Patient name: Lana Salazar  : 1970  MRN: [id number]  Referring provider: John Perea MD  Dx:   Encounter Diagnosis     ICD-10-CM    1. Hx of total ankle replacement, right  Z96.661                      Subjective: Lana reports doing well,     Objective: See treatment diary below      Assessment: Tolerated treatment well. Patient exhibited good technique with therapeutic exercises and would benefit from continued PT  Continue to work on dorsiflexion mobility and walking mechanics , strength and mobility continue to improve        Plan: Continue per plan of care.      Precautions: WBAT in brace       Manuals 3/27            PROM AF                                                   Neuro Re-Ed                                                                                                        Ther Ex             Bike 10 min             Gastroc stretch with strap 30 sec  X 3             Ankle circles CW/CCW  30            TB PF/IN/EV 30 black              Standing rocker board PF/DF 30            Cone heel toe step overs 30            SLS with assist 10  sec  X 10            Step downs 4\"  30            Side steps and back pedal with shannan 30            Walking sled push 50#  3 laps             DF mob with band on step 30            Ther Activity                                       Gait Training                                       Modalities                                          3  "

## 2024-04-01 ENCOUNTER — OFFICE VISIT (OUTPATIENT)
Dept: PHYSICAL THERAPY | Facility: MEDICAL CENTER | Age: 54
End: 2024-04-01
Payer: COMMERCIAL

## 2024-04-01 DIAGNOSIS — Z96.661 HX OF TOTAL ANKLE REPLACEMENT, RIGHT: Primary | ICD-10-CM

## 2024-04-01 PROCEDURE — 97110 THERAPEUTIC EXERCISES: CPT | Performed by: PHYSICAL THERAPIST

## 2024-04-01 PROCEDURE — 97140 MANUAL THERAPY 1/> REGIONS: CPT | Performed by: PHYSICAL THERAPIST

## 2024-04-02 NOTE — PROGRESS NOTES
"Daily Note     Today's date: 2024  Patient name: Lana Salazar  : 1970  MRN: [id number]  Referring provider: John Perea MD  Dx:   Encounter Diagnosis     ICD-10-CM    1. Hx of total ankle replacement, right  Z96.661                      Subjective: Lana reports doing well,     Objective: See treatment diary below      Assessment: Tolerated treatment well. Patient exhibited good technique with therapeutic exercises and would benefit from continued PT  Continue to work on dorsiflexion mobility and walking mechanics , strength and mobility continue to improve        Plan: Continue per plan of care.      Precautions: WBAT in brace       Manuals             PROM AF                                                   Neuro Re-Ed                                                                                                        Ther Ex             Bike 10 min             Gastroc stretch with strap 30 sec  X 3             Ankle circles CW/CCW  30            TB PF/IN/EV 30 black              Standing rocker board PF/DF 30            Cone heel toe step overs 30            SLS with assist 10  sec  X 10            Step downs 4\"  30            Side steps and back pedal with shannan 30            Walking sled push 50#  3 laps             DF mob with band on step 30            Ther Activity                                       Gait Training                                       Modalities                                          3  "

## 2024-04-03 ENCOUNTER — OFFICE VISIT (OUTPATIENT)
Dept: PHYSICAL THERAPY | Facility: MEDICAL CENTER | Age: 54
End: 2024-04-03
Payer: COMMERCIAL

## 2024-04-03 DIAGNOSIS — Z96.661 HX OF TOTAL ANKLE REPLACEMENT, RIGHT: Primary | ICD-10-CM

## 2024-04-03 PROCEDURE — 97140 MANUAL THERAPY 1/> REGIONS: CPT | Performed by: PHYSICAL THERAPIST

## 2024-04-03 PROCEDURE — 97110 THERAPEUTIC EXERCISES: CPT | Performed by: PHYSICAL THERAPIST

## 2024-04-05 NOTE — PROGRESS NOTES
"Daily Note     Today's date: 2024  Patient name: Lana Salazar  : 1970  MRN: [id number]  Referring provider: John Perea MD  Dx:   Encounter Diagnosis     ICD-10-CM    1. Hx of total ankle replacement, right  Z96.661                      Subjective: Lana reports doing well,     Objective: See treatment diary below      Assessment: Tolerated treatment well. Patient has reached point where she can continue on her own, continue with ankle mobility exercises and strengthening through HEP.  Return if needed       Plan: Continue per plan of care.      Precautions: WBAT in brace       Manuals 4/3            PROM AF                                                   Neuro Re-Ed                                                                                                        Ther Ex             Bike 10 min             Gastroc stretch with strap 30 sec  X 3             Ankle circles CW/CCW  30            TB PF/IN/EV 30 black              Standing rocker board PF/DF 30            Cone heel toe step overs 30            SLS with assist 10  sec  X 10            Step downs 4\"  30            Side steps and back pedal with shannan 30            Walking sled push 50#  3 laps             DF mob with band on step 30            Ther Activity                                       Gait Training                                       Modalities                                          3  "

## 2024-04-08 ENCOUNTER — APPOINTMENT (OUTPATIENT)
Dept: PHYSICAL THERAPY | Facility: MEDICAL CENTER | Age: 54
End: 2024-04-08
Payer: COMMERCIAL

## 2024-04-10 ENCOUNTER — APPOINTMENT (OUTPATIENT)
Dept: PHYSICAL THERAPY | Facility: MEDICAL CENTER | Age: 54
End: 2024-04-10
Payer: COMMERCIAL

## 2024-04-18 LAB
APOB+LDLR+PCSK9 GENE MUT ANL BLD/T: NOT DETECTED
BRCA1+BRCA2 DEL+DUP + FULL MUT ANL BLD/T: NOT DETECTED
MLH1+MSH2+MSH6+PMS2 GN DEL+DUP+FUL M: NOT DETECTED

## 2024-08-22 NOTE — LETTER
2022    Ana Rudolph MD  [address] [address]    Patient: Becca Rios   YOB: 1970   Date of Visit: 2/10/2022     Encounter Diagnosis     ICD-10-CM    1  Orthopedic aftercare  Z47 89    2  Right ankle pain, unspecified chronicity  M25 571        Dear Dr Escoto Lacks: Thank you for your recent referral of Becca Rios  Please review the attached evaluation summary from [facility] recent visit  Please verify that you agree with the plan of care by signing the attached order  If you have any questions or concerns, please do not hesitate to call  I sincerely appreciate the opportunity to share in the care of one of your patients and hope to have another opportunity to work with you in the near future  Sincerely,    Christie Peter, PT      Referring Provider:      I certify that I have read the below Plan of Care and certify the need for these services furnished under this plan of treatment while under my care  Ana Rudolph MD  [address] [address]  Via Fax: [phone]          PT Evaluation     Today's date: 2/10/2022  Patient name: Becca Rios  : 1970  MRN: [id number]  Referring provider: Cruzito Camargo MD  Dx:   Encounter Diagnosis     ICD-10-CM    1  Orthopedic aftercare  Z47 89    2  Right ankle pain, unspecified chronicity  M25 571                   Assessment  Assessment details: Becca Rios is a pleasant 46 y o  female who presents with R ankle pain 7 weeks s/p R subtalar debridement and peroneus brevis/longus tendon repairs (DOS: 21)  Patient is currently WBAT wearing a lace-up brace and sneaker  Incision on R lateral ankle visualized and is healing well with 2 very mild scabs present on the most inferior aspect of incision  No excessive erythema, no drainage, no warmth surrounding incision   Patient presented with mild abrasion/erythema and very mild bruising present on the dorsum of her R foot  Erythema subsided after bracing was removed for the physical exam  Patient was educated in proper tightness when donning lace-up brace and educated to avoid wearing multiple layers of socks when wearing brace to prevent compression of R foot  Will continue to monitor and adjust brace fit as necessary  No further referral is necessary at this time based upon examination results  The primary movement problem is R ankle hypomobility as expected 7 weeks s/p R subtalar debridement and peroneus brevis/longus tendon repairs, which limits her ability to perform sit to stand transfers  In addition, R ankle edema further limits her ability to ascend and descend stairs to prior capacity  Patient also presents with R ankle weakness compared to the contralateral side, which prevents her from ambulating longer distances  Patient would benefit from skilled PT services to address the listed impairments to facilitate a return to [facility]  Thank you for the referral     Impairments: abnormal coordination, abnormal gait, abnormal muscle firing, abnormal muscle tone, abnormal or restricted ROM, abnormal movement, activity intolerance, impaired balance, impaired physical strength, lacks appropriate home exercise program and pain with function  Functional limitations: sit to stand transfers, ascending/descending stairs, walking, biking  Symptom irritability: lowBarriers to therapy: none  Understanding of Dx/Px/POC: good   Prognosis: good  Prognosis details: Positive prognostic indicators include positive attitude toward recovery  Negative prognostic indicators include chronicity of R ankle pain prior to surgery  Goals  Patient will be independent with home exercise program    Patient will demonstrate decreased swelling in R ankle to improve R ankle AROM  Patient will increase R ankle AROM to be nearly comparable to the contralateral side in all planes to improve quality of gait mechanics    Patient will increase R ankle strength to at least 4+/5 to be able to ambulate longer distances  Patient will be able to perform sit to stand transfers without pain  Patient will be able to ambulate 30 min with modifications as necessary  Patient will be able to ride her bike  Patient will be able to manage symptoms independently  Plan  Plan details: Prognosis is above given PT services 2x/week tapering to 1x/week over the next 8 weeks and given HEP adherence  Patient would benefit from: skilled physical therapy  Referral necessary: No  Planned modality interventions: cryotherapy and thermotherapy: hydrocollator packs  Planned therapy interventions: activity modification, balance, balance/weight bearing training, body mechanics training, compression, flexibility, functional ROM exercises, gait training, graded activity, graded exercise, home exercise program, joint mobilization, manual therapy, massage, Seals taping, motor coordination training, neuromuscular re-education, patient education, strengthening, stretching, therapeutic activities and therapeutic exercise  Frequency: 2x week  Duration in weeks: 8  Treatment plan discussed with: patient        Subjective Evaluation    History of Present Illness  Date of surgery: 12/23/2021  Mechanism of injury: surgery  Mechanism of injury: This is a 45 yo female presenting with R ankle pain 7 weeks s/p R subtalar debridement with repair of peroneus brevis and longus tendon (DOS: 12/23/21)  She reports that she has a history of R ankle pain after rolling her ankle a few times over the years when playing softball  She reports that she overturned her ankle when walking 2 years ago, and she developed a lot of ankle pain that limited her ability to walk  She received a CT and an MRI at this time that showed arthritis and bone spurs  She decided to proceed with a subtalar debridement on 12/23/21 and also had her peroneus brevis and longus tendons repaired   She reports that she was NWB in a CAM boot for 2 weeks after the surgery  After 2 weeks, she was WBAT in her CAM boot and used crutches for 1-2 weeks  She had her last physician follow-up on 22, and she has now weaned out of her CAM boot and into a lace-up brace with her sneaker  She reports that her pain is very minimal, but she has some mild pain in the front of her ankle when standing to get up after sitting for a long period of time  She also reports stiffness in the back of her ankle  Her biggest goals are to return to walking and biking  Not a recurrent problem   Quality of life: good    Pain  Current pain ratin  At best pain ratin  At worst pain ratin  Location: front of R ankle  Quality: tight (tightness in the back of R ankle)  Relieving factors: ice  Aggravating factors: stair climbing, standing and walking  Progression: improved    Social Support    Employment status: working (works in [employer])    Diagnostic Tests  MRI studies: abnormal (prior to surgery per patient)  CT scan: abnormal (prior to surgery per patient)  Treatments  Previous treatment: injection treatment and immobilization  Patient Goals  Patient goals for therapy: decreased pain, increased motion, increased strength, independence with ADLs/IADLs and return to sport/leisure activities  Patient goal: to be able to walk, to be able to ascend/descend stairs, to be able to bike        Objective     Observations     Right Ankle/Foot   Positive for abrasion (dorsum of R foot), edema (mild- R lateral ankle) and incision       Additional Observation Details  Incision on R lateral ankle visualized and is well-healed with 2 very mild scabs noted on most inferior aspect of the incision; no excessive erythema, no drainage, no warmth    Mild erythema/very mild bruising noted on dorsum of R foot (most likely due to compression from bracing being too tight)    Active Range of Motion   Left Ankle/Foot   Dorsiflexion (ke): 0 degrees   Plantar flexion: 45 degrees   Inversion: 40 degrees   Eversion: 10 degrees     Right Ankle/Foot   Dorsiflexion (ke): 0 degrees   Plantar flexion: 40 degrees   Inversion: 4 degrees   Eversion: 2 degrees     Passive Range of Motion   Left Ankle/Foot    Dorsiflexion (ke): 5 degrees   Plantar flexion: 45 degrees   Inversion: 40 degrees   Eversion: 15 degrees     Right Ankle/Foot    Dorsiflexion (ke): 0 degrees   Plantar flexion: 45 degrees   Inversion: 8 degrees   Eversion: 5 degrees     Strength/Myotome Testing     Left Ankle/Foot   Dorsiflexion: 5  Plantar flexion: 5  Inversion: 5  Eversion: 5    Additional Strength Details  R ankle strength testing deferred due to post-op period    Tests     Additional Tests Details  R ankle special testing deferred due to post-op period    Ambulation   Weight-Bearing Status   Weight-Bearing Status (Right): weight-bearing as tolerated    wearing lace-up brace     Observational Gait   Gait: antalgic              Precautions: s/p R subtalar joint debridement + peroneus longus/brevis tendon repairs (DOS: 12/23/21)- WBAT in ankle brace      Manuals 2/10            R ankle PROM (NO INV/EV)                                                    Neuro Re-Ed             Romberg             Tandem             SLS                                                                 Ther Ex             Rec bike NV            Ankle DF/PF AROM 5"x30 HEP            Strap gastroc stretch NV            Seated heel slides NV            Seated towel intrinsic scoops NV            BAPS NV DF/PF                                      Ther Activity                                       Gait Training                                       Modalities             CP to R ankle PRN Declined [facility]